# Patient Record
Sex: FEMALE | URBAN - METROPOLITAN AREA
[De-identification: names, ages, dates, MRNs, and addresses within clinical notes are randomized per-mention and may not be internally consistent; named-entity substitution may affect disease eponyms.]

---

## 2017-07-12 ENCOUNTER — OFFICE VISIT (OUTPATIENT)
Dept: INTERNAL MEDICINE CLINIC | Age: 42
End: 2017-07-12

## 2017-07-12 VITALS
OXYGEN SATURATION: 98 % | BODY MASS INDEX: 23.07 KG/M2 | TEMPERATURE: 97.5 F | RESPIRATION RATE: 20 BRPM | SYSTOLIC BLOOD PRESSURE: 108 MMHG | HEIGHT: 67 IN | DIASTOLIC BLOOD PRESSURE: 64 MMHG | HEART RATE: 65 BPM | WEIGHT: 147 LBS

## 2017-07-12 DIAGNOSIS — G35 MULTIPLE SCLEROSIS (HCC): ICD-10-CM

## 2017-07-12 DIAGNOSIS — M79.601 RIGHT ARM PAIN: Primary | ICD-10-CM

## 2017-07-12 DIAGNOSIS — M54.9 UPPER BACK PAIN ON RIGHT SIDE: ICD-10-CM

## 2017-07-12 DIAGNOSIS — M43.6 STIFFNESS OF NECK: ICD-10-CM

## 2017-07-12 RX ORDER — PREDNISONE 20 MG/1
TABLET ORAL
Qty: 13 TAB | Refills: 0 | Status: SHIPPED | OUTPATIENT
Start: 2017-07-12 | End: 2017-07-18 | Stop reason: ALTCHOICE

## 2017-07-12 NOTE — MR AVS SNAPSHOT
Visit Information Date & Time Provider Department Dept. Phone Encounter #  
 7/12/2017  1:00 PM Luis Carrion, [address] [facility],[address] Internal Medicine [phone] [id number] Upcoming Health Maintenance Date Due DTaP/Tdap/Td series (1 - Tdap) 6/18/1994 PAP AKA CERVICAL CYTOLOGY 6/18/1994 INFLUENZA AGE 9 TO ADULT 8/1/2017 Allergies as of 7/12/2017  Review Complete On: 7/12/2017 By: Luis Carrion MD  
 No Known Allergies Current Immunizations  Never Reviewed No immunizations on file. Not reviewed this visit You Were Diagnosed With   
  
 Codes Comments Right arm pain    -  Primary ICD-10-CM: W74.829 ICD-9-CM: 729.5 Upper back pain on right side     ICD-10-CM: M54.9 ICD-9-CM: 724.5 Stiffness of neck     ICD-10-CM: M43.6 ICD-9-CM: 723.5 [facility] sclerosis ([facility]     ICD-10-CM: G35 
ICD-9-CM: 060 Vitals BP Pulse Temp Resp Height(growth percentile) Weight(growth percentile) 108/64 65 97.5 °F (36.4 °C) (Oral) 20 5' 7\" (1.702 m) 147 lb (66.7 kg) LMP SpO2 BMI OB Status Smoking Status 07/04/2017 98% 23.02 kg/m2 Having regular periods Never Smoker BMI and BSA Data Body Mass Index Body Surface Area 23.02 kg/m 2 1.78 m 2 Preferred Pharmacy Pharmacy Name Phone [facility] [phone] [facility] [phone] Your Updated Medication List  
  
   
This list is accurate as of: 7/12/17  1:44 PM.  Always use your most recent med list.  
  
  
  
  
 baclofen 10 mg tablet Commonly known as:  LIORESAL Take 1 Tab by mouth two (2) times a day. multivitamin tablet Commonly known as:  ONE A DAY Take 1 Tab by mouth daily. predniSONE 20 mg tablet Commonly known as:  Sydnie Durán Prednisone 60 mg po x 2 days, 40 mg po x 2 days, 20 mg po x 2  days, 10 mg po x 1 day then stop. TECFIDERA 240 mg Cpdr  
Generic drug:  dimethyl fumarate Take  by mouth. Prescriptions Sent to Pharmacy Refills  
 predniSONE (DELTASONE) 20 mg tablet 0 Sig: Prednisone 60 mg po x 2 days, 40 mg po x 2 days, 20 mg po x 2  days, 10 mg po x 1 day then stop. Class: Normal  
 Pharmacy: [address] [facility] [address] [facility] #: [phone] [facility]! Dear Sissy Betts: Thank you for requesting a [employer] account. Our records indicate that you already have an active [employer] account. You can access your account anytime at [email or web address] Did you know that you can access your hospital and ER discharge instructions at any time in [employer]? You can also review all of your test results from your hospital stay or ER visit. Additional Information If you have questions, please visit the Frequently Asked Questions section of the [employer] website at [email or web address] Remember, [employer] is NOT to be used for urgent needs. For medical emergencies, dial 911. Now available from your iPhone and Android! Please provide this summary of care documentation to your next provider. Your primary care clinician is listed as Brian Muñoz. If you have any questions after today's visit, please call [phone].

## 2017-07-12 NOTE — PROGRESS NOTES
Written by Sivakumar Mojica, as dictated by Dr. Hazel Vance MD.    Miguel Bell is a 38 y.o. female. HPI  The patient comes in today c/o R arm pain and upper back pain for the past 3 days. She has been taking OTC ibuprofen which did not help. She has also tried Bengay The pain started as nighttime pain but is now during the day as well. She has hx of multiple sclerosis and [place] [facility] [place]. She has not had a flare-up since 11/2015. She is compliant on baclofen and vitamin D supplements. She denies trying to lift anything heavy but she has been doing some exercises. Patient Active Problem List   Diagnosis Code    Urinary incontinence R32    Fatigue R53.83    Constipation K59.00    Multiple sclerosis ([facility] G35        Current Outpatient Prescriptions on File Prior to Visit   Medication Sig Dispense Refill    dimethyl fumarate (TECFIDERA) 240 mg cpDR Take  by mouth.  multivitamin (ONE A DAY) tablet Take 1 Tab by mouth daily.  baclofen (LIORESAL) 10 mg tablet Take 1 Tab by mouth two (2) times a day. 90 Tab 3     No current facility-administered medications on file prior to visit. No Known Allergies    Past Medical History:   Diagnosis Date    Constipation 6/24/2009    Fatigue 6/24/2009    MS (multiple sclerosis) ([facility]     Urinary incontinence 6/24/2009       Past Surgical History:   Procedure Laterality Date    HX GYN      tubal ligation       Family History   Problem Relation Age of Onset    Liver Disease Mother        Social History     Social History    Marital status:      Spouse name: N/A    Number of children: N/A    Years of education: N/A     Occupational History    Not on file.      Social History Main Topics    Smoking status: Never Smoker    Smokeless tobacco: Never Used    Alcohol use No    Drug use: No    Sexual activity: Yes     Partners: Male      Comment:      Other Topics Concern    Not on file     Social History Narrative           Review of Systems   Constitutional: Negative for malaise/fatigue. HENT: Negative for congestion. Respiratory: Negative for cough and shortness of breath. Musculoskeletal: Positive for back pain and joint pain. Negative for myalgias. Neurological: Negative for weakness and headaches. Visit Vitals    /64    Pulse 65    Temp 97.5 °F (36.4 °C) (Oral)    Resp 20    Ht 5' 7\" (1.702 m)    Wt 147 lb (66.7 kg)    LMP 07/04/2017    SpO2 98%    BMI 23.02 kg/m2       Physical Exam   Constitutional: She is oriented to person, place, and time. She appears well-developed and well-nourished. No distress. HENT:   Right Ear: External ear normal.   Left Ear: External ear normal.   Eyes: Conjunctivae and EOM are normal. Right eye exhibits no discharge. Left eye exhibits no discharge. Neck: Normal range of motion. Neck supple. Cardiovascular: Normal rate and regular rhythm. Pulmonary/Chest: Effort normal and breath sounds normal. She has no wheezes. Abdominal: Soft. Bowel sounds are normal. There is no tenderness. Musculoskeletal: Normal range of motion. Normal ROM on R arm. Tenderness & upper back stiffness. Lymphadenopathy:     She has no cervical adenopathy. Neurological: She is alert and oriented to person, place, and time. Skin: She is not diaphoretic. Psychiatric: She has a normal mood and affect. Her behavior is normal.   Nursing note and vitals reviewed. ASSESSMENT and PLAN    ICD-10-CM ICD-9-CM    1. Right arm pain M79.601 729. 5 predniSONE (DELTASONE) 20 mg tablet sent to pharmacy   2. Upper back pain on right side M54.9 724.5 predniSONE (DELTASONE) 20 mg tablet sent to pharmacy   3. Stiffness of neck M43.6 723. 5 predniSONE (DELTASONE) 20 mg tablet sent to pharmacy    I want the patient to take the medication po as follows: 3 pills x 1 day, 2 pills x 2 days, 1 pill x 2 days and 1/2 pill x 1 day.  The patient was advised to take this medication with food. She should continue to take baclofen. 4. Multiple sclerosis ([facility] G35 340   [place] [facility] This plan was reviewed with the patient and patient agrees. All questions were answered. This scribe documentation was reviewed by me and accurately reflects the examination and decisions made by me. This note will not be viewable in [address]

## 2017-07-17 ENCOUNTER — DOCUMENTATION ONLY (OUTPATIENT)
Dept: INTERNAL MEDICINE CLINIC | Age: 42
End: 2017-07-17

## 2017-07-18 ENCOUNTER — DOCUMENTATION ONLY (OUTPATIENT)
Dept: INTERNAL MEDICINE CLINIC | Age: 42
End: 2017-07-18

## 2017-07-18 ENCOUNTER — OFFICE VISIT (OUTPATIENT)
Dept: INTERNAL MEDICINE CLINIC | Age: 42
End: 2017-07-18

## 2017-07-18 VITALS
RESPIRATION RATE: 18 BRPM | SYSTOLIC BLOOD PRESSURE: 112 MMHG | OXYGEN SATURATION: 98 % | TEMPERATURE: 98.1 F | DIASTOLIC BLOOD PRESSURE: 60 MMHG | HEART RATE: 105 BPM

## 2017-07-18 DIAGNOSIS — M79.601 RIGHT ARM PAIN: Primary | ICD-10-CM

## 2017-07-18 DIAGNOSIS — G35 MULTIPLE SCLEROSIS (HCC): ICD-10-CM

## 2017-07-18 RX ORDER — ACETAMINOPHEN AND CODEINE PHOSPHATE 300; 30 MG/1; MG/1
1 TABLET ORAL
Qty: 15 TAB | Refills: 0 | Status: SHIPPED | OUTPATIENT
Start: 2017-07-18 | End: 2018-02-21 | Stop reason: ALTCHOICE

## 2017-07-18 NOTE — PROGRESS NOTES
Rapid referral sheet for [facility] has been completed and attached notes from office visit. Faxed to number on form.

## 2017-07-18 NOTE — PROGRESS NOTES
Chief Complaint   Patient presents with    Shoulder Pain     right, pain goes and comes, meds aren't working    Back Pain     1. Have you been to the ER, urgent care clinic since your last visit? Hospitalized since your last visit? No    2. Have you seen or consulted any other health care providers outside of the [address] [place] [address] since your last visit? Include any pap smears or colon screening.  No

## 2017-07-18 NOTE — PROGRESS NOTES
Written by Suly aVrela, as dictated by Dr. Ariana Hadley MD.    Prisca Morocho is a 38 y.o. female. HPI  The patient comes in today c/o upper  back and shoulder pain. She finished a course of prednisone which she started on 07/12. The pain has lessened in intensity but has not gone away completely. She is not sleeping well at night. Pain starts  from the back of the neck radiating to the right arm. Patient Active Problem List   Diagnosis Code    Urinary incontinence R32    Fatigue R53.83    Constipation K59.00    Multiple sclerosis ([facility] G35        Current Outpatient Prescriptions on File Prior to Visit   Medication Sig Dispense Refill    dimethyl fumarate (TECFIDERA) 240 mg cpDR Take  by mouth.  multivitamin (ONE A DAY) tablet Take 1 Tab by mouth daily.  baclofen (LIORESAL) 10 mg tablet Take 1 Tab by mouth two (2) times a day. 90 Tab 3     No current facility-administered medications on file prior to visit. Allergies   Allergen Reactions    Penicillins Rash       Past Medical History:   Diagnosis Date    Constipation 6/24/2009    Fatigue 6/24/2009    MS (multiple sclerosis) ([facility]     Urinary incontinence 6/24/2009       Past Surgical History:   Procedure Laterality Date    HX GYN      tubal ligation       Family History   Problem Relation Age of Onset    Liver Disease Mother        Social History     Social History    Marital status:      Spouse name: N/A    Number of children: N/A    Years of education: N/A     Occupational History    Not on file. Social History Main Topics    Smoking status: Never Smoker    Smokeless tobacco: Never Used    Alcohol use No    Drug use: No    Sexual activity: Yes     Partners: Male      Comment:      Other Topics Concern    Not on file     Social History Narrative         Review of Systems   Constitutional: Negative for malaise/fatigue. HENT: Negative for congestion. Musculoskeletal: Positive for back pain and neck pain. Negative for joint pain and myalgias. Neurological: Positive for tingling and sensory change. Negative for weakness and headaches. Visit Vitals    /60 (BP 1 Location: Left arm, BP Patient Position: Sitting)    Pulse (!) 105    Temp 98.1 °F (36.7 °C) (Oral)    Resp 18    LMP 07/04/2017    SpO2 98%       Physical Exam   Constitutional: She is oriented to person, place, and time. She appears well-developed and well-nourished. No distress. Eyes: Conjunctivae and EOM are normal.   Cardiovascular: Normal rate and regular rhythm. Pulmonary/Chest: Effort normal and breath sounds normal. She has no wheezes. Abdominal: Soft. Bowel sounds are normal.   Musculoskeletal: She exhibits no edema. R upper back tenderness   ROM on cervical spine normal.   Neurological: She is alert and oriented to person, place, and time. Psychiatric: She has a normal mood and affect. Nursing note and vitals reviewed. ASSESSMENT and PLAN    ICD-10-CM ICD-9-CM    1. Right arm pain M79.601 729.5 REFERRAL TO HOME HEALTH      acetaminophen-codeine (TYLENOL #3) 300-30 mg per tablet script given to patient    I want her to start on home health PT. She can also take ibuprofen, which should be taken with food. If the pain persists, I also gave her Tylenol with codeine to take at night. 2. Multiple sclerosis ([facility] G35 [address] [facility]      acetaminophen-codeine (TYLENOL #3) 300-30 mg per tablet script given to patient    She needs to get a neck MRI when she goes in to get her next brain  MRI. This plan was reviewed with the patient and patient agrees. All questions were answered. This scribe documentation was reviewed by me and accurately reflects the examination and decisions made by me. This note will not be viewable in [address]

## 2017-10-17 ENCOUNTER — OFFICE VISIT (OUTPATIENT)
Dept: INTERNAL MEDICINE CLINIC | Age: 42
End: 2017-10-17

## 2017-10-17 VITALS
DIASTOLIC BLOOD PRESSURE: 62 MMHG | HEART RATE: 80 BPM | RESPIRATION RATE: 16 BRPM | HEIGHT: 67 IN | TEMPERATURE: 98.1 F | OXYGEN SATURATION: 98 % | SYSTOLIC BLOOD PRESSURE: 94 MMHG

## 2017-10-17 DIAGNOSIS — G35 MULTIPLE SCLEROSIS (HCC): ICD-10-CM

## 2017-10-17 DIAGNOSIS — N39.490 OVERFLOW INCONTINENCE OF URINE: ICD-10-CM

## 2017-10-17 DIAGNOSIS — M50.30 DEGENERATIVE DISC DISEASE, CERVICAL: ICD-10-CM

## 2017-10-17 DIAGNOSIS — Z23 ENCOUNTER FOR IMMUNIZATION: ICD-10-CM

## 2017-10-17 DIAGNOSIS — E55.9 VITAMIN D DEFICIENCY: ICD-10-CM

## 2017-10-17 DIAGNOSIS — M79.631 RIGHT FOREARM PAIN: Primary | ICD-10-CM

## 2017-10-17 DIAGNOSIS — Z79.52 LONG TERM CURRENT USE OF SYSTEMIC STEROIDS: ICD-10-CM

## 2017-10-17 RX ORDER — ERGOCALCIFEROL 1.25 MG/1
50000 CAPSULE ORAL
Qty: 30 CAP | Refills: 0 | Status: SHIPPED | OUTPATIENT
Start: 2017-10-17 | End: 2018-05-09

## 2017-10-17 RX ORDER — ERGOCALCIFEROL 1.25 MG/1
50000 CAPSULE ORAL
Qty: 30 CAP | Refills: 0 | Status: SHIPPED | OUTPATIENT
Start: 2017-10-17 | End: 2017-10-17 | Stop reason: SDUPTHER

## 2017-10-17 RX ORDER — OXYBUTYNIN CHLORIDE 5 MG/1
5 TABLET ORAL 3 TIMES DAILY
Qty: 90 TAB | Refills: 0 | Status: SHIPPED | OUTPATIENT
Start: 2017-10-17 | End: 2017-10-17 | Stop reason: SDUPTHER

## 2017-10-17 RX ORDER — TRAMADOL HYDROCHLORIDE 50 MG/1
TABLET ORAL
COMMUNITY
Start: 2017-10-15 | End: 2018-02-21 | Stop reason: ALTCHOICE

## 2017-10-17 RX ORDER — OXYBUTYNIN CHLORIDE 5 MG/1
5 TABLET ORAL 3 TIMES DAILY
Qty: 90 TAB | Refills: 0 | Status: SHIPPED | OUTPATIENT
Start: 2017-10-17 | End: 2017-11-16

## 2017-10-17 RX ORDER — PREDNISONE 20 MG/1
TABLET ORAL
COMMUNITY
Start: 2017-10-15 | End: 2018-02-21 | Stop reason: ALTCHOICE

## 2017-10-17 RX ORDER — DICLOFENAC SODIUM 10 MG/G
4 GEL TOPICAL 4 TIMES DAILY
Qty: 4 EACH | Refills: 0 | Status: SHIPPED | OUTPATIENT
Start: 2017-10-17 | End: 2017-11-06

## 2017-10-17 NOTE — PROGRESS NOTES
Written by Christine Gutierres, as dictated by Dr. Sampson Garcia MD.    Janell Hernandez is a 38 y.o. female. HPI  The patient comes in today c/o R forearm pain, which she has experienced before. The pain did improve after home PT, but the pain started again 3-4 days ago. She went to [facility] ED where she was given prednisone and got XRs and a CT, which showed mild cervical degenerative disc disease with no acute c-spine fracture. She has not lifted anything heavy lately, but she does readjust herself on the floor with her arms. The prednisone has not been helping with her pain. She has never had a DEXA scan. She has been experiencing urinary incontinence and would like to try medication for this. She received a flu shot today. Patient Active Problem List   Diagnosis Code    Urinary incontinence R32    Fatigue R53.83    Constipation K59.00    Multiple sclerosis ([facility] G35        Current Outpatient Prescriptions on File Prior to Visit   Medication Sig Dispense Refill    dimethyl fumarate (TECFIDERA) 240 mg cpDR Take  by mouth.  multivitamin (ONE A DAY) tablet Take 1 Tab by mouth daily.  baclofen (LIORESAL) 10 mg tablet Take 1 Tab by mouth two (2) times a day. 90 Tab 3    acetaminophen-codeine (TYLENOL #3) 300-30 mg per tablet Take 1 Tab by mouth every six (6) hours as needed for Pain for up to 15 doses. Max Daily Amount: 4 Tabs. 15 Tab 0     No current facility-administered medications on file prior to visit.         Allergies   Allergen Reactions    Penicillins Rash       Past Medical History:   Diagnosis Date    Constipation 6/24/2009    Fatigue 6/24/2009    MS (multiple sclerosis) ([facility]     Urinary incontinence 6/24/2009       Past Surgical History:   Procedure Laterality Date    HX GYN      tubal ligation       Family History   Problem Relation Age of Onset    Liver Disease Mother        Social History     Social History    Marital status:      Spouse name: N/A    Number of children: N/A    Years of education: N/A     Occupational History    Not on file. Social History Main Topics    Smoking status: Never Smoker    Smokeless tobacco: Never Used    Alcohol use No    Drug use: No    Sexual activity: Yes     Partners: Male      Comment:      Other Topics Concern    Not on file     Social History Narrative           Review of Systems   Constitutional: Negative for malaise/fatigue. HENT: Negative for congestion. Respiratory: Negative for cough and shortness of breath. Cardiovascular: Negative for chest pain and palpitations. Gastrointestinal: Negative for abdominal pain and nausea. Genitourinary: Positive for frequency. Negative for dysuria and flank pain. Musculoskeletal: Positive for joint pain. Negative for myalgias. Neurological: Positive for sensory change. Negative for dizziness, weakness and headaches. Visit Vitals    BP 94/62 (BP 1 Location: Left arm, BP Patient Position: Sitting)    Pulse 80    Temp 98.1 °F (36.7 °C) (Oral)    Resp 16    Ht 5' 7\" (1.702 m)    LMP 10/03/2017    SpO2 98%       Physical Exam   Constitutional: She is oriented to person, place, and time. She appears well-developed and well-nourished. No distress. HENT:   Right Ear: External ear normal.   Left Ear: External ear normal.   Eyes: Conjunctivae and EOM are normal.   Neck: Normal range of motion. Neck supple. Cardiovascular: Normal rate and regular rhythm. Pulmonary/Chest: Effort normal and breath sounds normal.   Abdominal: Soft. Bowel sounds are normal. There is no tenderness. Musculoskeletal:   Right forearm no swelling. ROM nl on wrist & elbow joint. Neurological: She is alert and oriented to person, place, and time. Skin: She is not diaphoretic. Psychiatric: She has a normal mood and affect. Her behavior is normal.   Nursing note and vitals reviewed.       ASSESSMENT and PLAN    ICD-10-CM ICD-9-CM    1. Right forearm pain M79.631 729.5 REFERRAL TO HOME HEALTH      diclofenac (VOLTAREN) 1 % gel sent to pharmacy   2. Encounter for immunization Z23 V03.89 INFLUENZA VIRUS VAC QUAD,SPLIT,PRESV FREE SYRINGE IM   3. Multiple sclerosis ([facility] G35 [address]    Referred to home health PT. Diclofenac gel  prescribed. 4. Long term current use of systemic steroids Z79.52 V58.65 DEXA BONE DENSITY STUDY AXIAL    I want her to get a DEXA scan since she has been on prednisone for a long time. 5. Vitamin D deficiency E55.9 268.9 ergocalciferol (ERGOCALCIFEROL) 50,000 unit capsule sent to pharmacy          Vitamin D supplements prescribed. 6. Overflow incontinence of urine N39.490 788.38 oxybutynin (DITROPAN) 5 mg tablet sent to pharmacy        Ditropan prescribed. She can take 1/2 pill at night and see if it works. I explained this can be taken up to TID. This plan was reviewed with the patient and patient agrees. All questions were answered. This scribe documentation was reviewed by me and accurately reflects the examination and decisions made by me. This note will not be viewable in [address]

## 2017-10-17 NOTE — PROGRESS NOTES
Chief Complaint   Patient presents with    Shoulder Pain     right shoulder pain and neck pain went to [facility] ER on Sunday.

## 2017-10-21 ENCOUNTER — HOME HEALTH ADMISSION (OUTPATIENT)
Dept: HOME HEALTH SERVICES | Facility: HOME HEALTH | Age: 42
End: 2017-10-21
Payer: COMMERCIAL

## 2017-10-28 ENCOUNTER — HOME CARE VISIT (OUTPATIENT)
Dept: SCHEDULING | Facility: HOME HEALTH | Age: 42
End: 2017-10-28

## 2017-10-29 ENCOUNTER — HOME CARE VISIT (OUTPATIENT)
Dept: SCHEDULING | Facility: HOME HEALTH | Age: 42
End: 2017-10-29
Payer: COMMERCIAL

## 2017-10-29 VITALS
SYSTOLIC BLOOD PRESSURE: 96 MMHG | RESPIRATION RATE: 16 BRPM | OXYGEN SATURATION: 97 % | DIASTOLIC BLOOD PRESSURE: 54 MMHG | TEMPERATURE: 98.1 F | HEART RATE: 62 BPM

## 2017-10-29 PROCEDURE — 400013 HH SOC

## 2017-10-29 PROCEDURE — G0151 HHCP-SERV OF PT,EA 15 MIN: HCPCS

## 2017-10-31 ENCOUNTER — HOME CARE VISIT (OUTPATIENT)
Dept: SCHEDULING | Facility: HOME HEALTH | Age: 42
End: 2017-10-31
Payer: COMMERCIAL

## 2017-10-31 VITALS — DIASTOLIC BLOOD PRESSURE: 59 MMHG | OXYGEN SATURATION: 98 % | TEMPERATURE: 96.7 F | SYSTOLIC BLOOD PRESSURE: 100 MMHG

## 2017-10-31 VITALS
RESPIRATION RATE: 16 BRPM | SYSTOLIC BLOOD PRESSURE: 122 MMHG | DIASTOLIC BLOOD PRESSURE: 80 MMHG | OXYGEN SATURATION: 97 % | HEART RATE: 80 BPM

## 2017-10-31 PROCEDURE — G0152 HHCP-SERV OF OT,EA 15 MIN: HCPCS

## 2017-10-31 PROCEDURE — G0151 HHCP-SERV OF PT,EA 15 MIN: HCPCS

## 2017-11-02 ENCOUNTER — HOME CARE VISIT (OUTPATIENT)
Dept: HOME HEALTH SERVICES | Facility: HOME HEALTH | Age: 42
End: 2017-11-02
Payer: COMMERCIAL

## 2017-11-03 ENCOUNTER — HOME CARE VISIT (OUTPATIENT)
Dept: HOME HEALTH SERVICES | Facility: HOME HEALTH | Age: 42
End: 2017-11-03
Payer: COMMERCIAL

## 2017-11-03 ENCOUNTER — HOME CARE VISIT (OUTPATIENT)
Dept: SCHEDULING | Facility: HOME HEALTH | Age: 42
End: 2017-11-03
Payer: COMMERCIAL

## 2017-11-06 ENCOUNTER — HOME CARE VISIT (OUTPATIENT)
Dept: SCHEDULING | Facility: HOME HEALTH | Age: 42
End: 2017-11-06
Payer: COMMERCIAL

## 2017-11-06 VITALS
DIASTOLIC BLOOD PRESSURE: 60 MMHG | TEMPERATURE: 98.2 F | HEART RATE: 67 BPM | SYSTOLIC BLOOD PRESSURE: 102 MMHG | OXYGEN SATURATION: 96 % | RESPIRATION RATE: 18 BRPM

## 2017-11-06 PROCEDURE — G0151 HHCP-SERV OF PT,EA 15 MIN: HCPCS

## 2017-11-08 ENCOUNTER — HOME CARE VISIT (OUTPATIENT)
Dept: SCHEDULING | Facility: HOME HEALTH | Age: 42
End: 2017-11-08
Payer: COMMERCIAL

## 2017-11-08 VITALS
SYSTOLIC BLOOD PRESSURE: 115 MMHG | TEMPERATURE: 96.5 F | OXYGEN SATURATION: 98 % | HEART RATE: 125 BPM | DIASTOLIC BLOOD PRESSURE: 60 MMHG

## 2017-11-08 VITALS
DIASTOLIC BLOOD PRESSURE: 60 MMHG | OXYGEN SATURATION: 98 % | SYSTOLIC BLOOD PRESSURE: 116 MMHG | RESPIRATION RATE: 16 BRPM | HEART RATE: 124 BPM | TEMPERATURE: 98 F

## 2017-11-08 PROCEDURE — G0152 HHCP-SERV OF OT,EA 15 MIN: HCPCS

## 2017-11-08 PROCEDURE — G0151 HHCP-SERV OF PT,EA 15 MIN: HCPCS

## 2017-11-13 ENCOUNTER — HOME CARE VISIT (OUTPATIENT)
Dept: SCHEDULING | Facility: HOME HEALTH | Age: 42
End: 2017-11-13
Payer: COMMERCIAL

## 2017-11-16 ENCOUNTER — HOME CARE VISIT (OUTPATIENT)
Dept: SCHEDULING | Facility: HOME HEALTH | Age: 42
End: 2017-11-16
Payer: COMMERCIAL

## 2017-11-16 VITALS
OXYGEN SATURATION: 97 % | HEART RATE: 106 BPM | TEMPERATURE: 98.8 F | DIASTOLIC BLOOD PRESSURE: 68 MMHG | RESPIRATION RATE: 16 BRPM | SYSTOLIC BLOOD PRESSURE: 110 MMHG

## 2017-11-16 PROCEDURE — G0299 HHS/HOSPICE OF RN EA 15 MIN: HCPCS

## 2017-11-17 ENCOUNTER — HOME CARE VISIT (OUTPATIENT)
Dept: SCHEDULING | Facility: HOME HEALTH | Age: 42
End: 2017-11-17
Payer: COMMERCIAL

## 2017-11-17 ENCOUNTER — HOME CARE VISIT (OUTPATIENT)
Dept: HOME HEALTH SERVICES | Facility: HOME HEALTH | Age: 42
End: 2017-11-17
Payer: COMMERCIAL

## 2017-11-17 VITALS
SYSTOLIC BLOOD PRESSURE: 112 MMHG | TEMPERATURE: 98.2 F | DIASTOLIC BLOOD PRESSURE: 64 MMHG | OXYGEN SATURATION: 96 % | HEART RATE: 97 BPM | RESPIRATION RATE: 16 BRPM

## 2017-11-17 PROCEDURE — G0300 HHS/HOSPICE OF LPN EA 15 MIN: HCPCS

## 2017-11-17 PROCEDURE — G0151 HHCP-SERV OF PT,EA 15 MIN: HCPCS

## 2017-11-18 ENCOUNTER — HOME CARE VISIT (OUTPATIENT)
Dept: HOME HEALTH SERVICES | Facility: HOME HEALTH | Age: 42
End: 2017-11-18
Payer: COMMERCIAL

## 2017-11-20 ENCOUNTER — HOME CARE VISIT (OUTPATIENT)
Dept: SCHEDULING | Facility: HOME HEALTH | Age: 42
End: 2017-11-20
Payer: COMMERCIAL

## 2017-11-20 VITALS
OXYGEN SATURATION: 98 % | HEART RATE: 102 BPM | DIASTOLIC BLOOD PRESSURE: 66 MMHG | RESPIRATION RATE: 16 BRPM | TEMPERATURE: 97.4 F | SYSTOLIC BLOOD PRESSURE: 110 MMHG

## 2017-11-20 VITALS
OXYGEN SATURATION: 99 % | DIASTOLIC BLOOD PRESSURE: 60 MMHG | SYSTOLIC BLOOD PRESSURE: 100 MMHG | TEMPERATURE: 98.7 F | HEART RATE: 99 BPM

## 2017-11-20 PROCEDURE — G0300 HHS/HOSPICE OF LPN EA 15 MIN: HCPCS

## 2017-11-20 PROCEDURE — G0152 HHCP-SERV OF OT,EA 15 MIN: HCPCS

## 2017-11-21 ENCOUNTER — HOME CARE VISIT (OUTPATIENT)
Dept: SCHEDULING | Facility: HOME HEALTH | Age: 42
End: 2017-11-21
Payer: COMMERCIAL

## 2017-11-21 VITALS
SYSTOLIC BLOOD PRESSURE: 122 MMHG | OXYGEN SATURATION: 99 % | RESPIRATION RATE: 16 BRPM | DIASTOLIC BLOOD PRESSURE: 70 MMHG | HEART RATE: 95 BPM

## 2017-11-21 PROCEDURE — G0151 HHCP-SERV OF PT,EA 15 MIN: HCPCS

## 2017-11-22 ENCOUNTER — HOME CARE VISIT (OUTPATIENT)
Dept: SCHEDULING | Facility: HOME HEALTH | Age: 42
End: 2017-11-22
Payer: COMMERCIAL

## 2017-11-22 PROCEDURE — G0151 HHCP-SERV OF PT,EA 15 MIN: HCPCS

## 2017-11-23 ENCOUNTER — HOME CARE VISIT (OUTPATIENT)
Dept: SCHEDULING | Facility: HOME HEALTH | Age: 42
End: 2017-11-23
Payer: COMMERCIAL

## 2017-11-23 PROCEDURE — G0300 HHS/HOSPICE OF LPN EA 15 MIN: HCPCS

## 2017-11-24 DIAGNOSIS — B37.31 VAGINAL CANDIDIASIS: Primary | ICD-10-CM

## 2017-11-24 RX ORDER — FLUCONAZOLE 150 MG/1
150 TABLET ORAL DAILY
Qty: 1 TAB | Refills: 0 | Status: SHIPPED | OUTPATIENT
Start: 2017-11-24 | End: 2017-11-25

## 2017-11-27 ENCOUNTER — HOME CARE VISIT (OUTPATIENT)
Dept: SCHEDULING | Facility: HOME HEALTH | Age: 42
End: 2017-11-27
Payer: COMMERCIAL

## 2017-11-27 VITALS
OXYGEN SATURATION: 98 % | SYSTOLIC BLOOD PRESSURE: 114 MMHG | HEART RATE: 72 BPM | TEMPERATURE: 97.5 F | DIASTOLIC BLOOD PRESSURE: 74 MMHG | RESPIRATION RATE: 16 BRPM

## 2017-11-27 VITALS
HEART RATE: 82 BPM | RESPIRATION RATE: 17 BRPM | SYSTOLIC BLOOD PRESSURE: 118 MMHG | TEMPERATURE: 98 F | DIASTOLIC BLOOD PRESSURE: 70 MMHG | OXYGEN SATURATION: 97 %

## 2017-11-27 PROCEDURE — G0152 HHCP-SERV OF OT,EA 15 MIN: HCPCS

## 2017-11-27 PROCEDURE — G0299 HHS/HOSPICE OF RN EA 15 MIN: HCPCS

## 2017-11-28 ENCOUNTER — HOME CARE VISIT (OUTPATIENT)
Dept: SCHEDULING | Facility: HOME HEALTH | Age: 42
End: 2017-11-28
Payer: COMMERCIAL

## 2017-11-28 VITALS
OXYGEN SATURATION: 99 % | HEART RATE: 90 BPM | TEMPERATURE: 98.4 F | SYSTOLIC BLOOD PRESSURE: 122 MMHG | DIASTOLIC BLOOD PRESSURE: 70 MMHG | RESPIRATION RATE: 16 BRPM

## 2017-11-28 VITALS — OXYGEN SATURATION: 95 % | HEART RATE: 82 BPM | DIASTOLIC BLOOD PRESSURE: 60 MMHG | SYSTOLIC BLOOD PRESSURE: 110 MMHG

## 2017-11-28 PROCEDURE — G0151 HHCP-SERV OF PT,EA 15 MIN: HCPCS

## 2017-11-29 ENCOUNTER — HOME CARE VISIT (OUTPATIENT)
Dept: SCHEDULING | Facility: HOME HEALTH | Age: 42
End: 2017-11-29
Payer: COMMERCIAL

## 2017-11-29 PROCEDURE — G0152 HHCP-SERV OF OT,EA 15 MIN: HCPCS

## 2017-11-30 ENCOUNTER — HOME CARE VISIT (OUTPATIENT)
Dept: SCHEDULING | Facility: HOME HEALTH | Age: 42
End: 2017-11-30
Payer: COMMERCIAL

## 2017-11-30 VITALS
OXYGEN SATURATION: 96 % | SYSTOLIC BLOOD PRESSURE: 120 MMHG | DIASTOLIC BLOOD PRESSURE: 70 MMHG | TEMPERATURE: 98.1 F | HEART RATE: 86 BPM

## 2017-11-30 PROCEDURE — G0151 HHCP-SERV OF PT,EA 15 MIN: HCPCS

## 2017-12-01 VITALS
TEMPERATURE: 97.8 F | DIASTOLIC BLOOD PRESSURE: 70 MMHG | HEART RATE: 60 BPM | OXYGEN SATURATION: 99 % | RESPIRATION RATE: 16 BRPM | SYSTOLIC BLOOD PRESSURE: 118 MMHG

## 2017-12-04 ENCOUNTER — HOME CARE VISIT (OUTPATIENT)
Dept: SCHEDULING | Facility: HOME HEALTH | Age: 42
End: 2017-12-04
Payer: COMMERCIAL

## 2017-12-04 PROCEDURE — G0152 HHCP-SERV OF OT,EA 15 MIN: HCPCS

## 2017-12-05 ENCOUNTER — HOME CARE VISIT (OUTPATIENT)
Dept: SCHEDULING | Facility: HOME HEALTH | Age: 42
End: 2017-12-05
Payer: COMMERCIAL

## 2017-12-05 ENCOUNTER — DOCUMENTATION ONLY (OUTPATIENT)
Dept: INTERNAL MEDICINE CLINIC | Age: 42
End: 2017-12-05

## 2017-12-05 VITALS
DIASTOLIC BLOOD PRESSURE: 70 MMHG | TEMPERATURE: 98 F | SYSTOLIC BLOOD PRESSURE: 122 MMHG | OXYGEN SATURATION: 97 % | RESPIRATION RATE: 16 BRPM | HEART RATE: 80 BPM

## 2017-12-05 PROCEDURE — G0151 HHCP-SERV OF PT,EA 15 MIN: HCPCS

## 2017-12-07 ENCOUNTER — HOME CARE VISIT (OUTPATIENT)
Dept: SCHEDULING | Facility: HOME HEALTH | Age: 42
End: 2017-12-07
Payer: COMMERCIAL

## 2017-12-07 ENCOUNTER — HOME CARE VISIT (OUTPATIENT)
Dept: HOME HEALTH SERVICES | Facility: HOME HEALTH | Age: 42
End: 2017-12-07
Payer: COMMERCIAL

## 2017-12-07 VITALS
TEMPERATURE: 98.2 F | OXYGEN SATURATION: 97 % | DIASTOLIC BLOOD PRESSURE: 80 MMHG | HEART RATE: 84 BPM | SYSTOLIC BLOOD PRESSURE: 106 MMHG

## 2017-12-07 PROCEDURE — G0152 HHCP-SERV OF OT,EA 15 MIN: HCPCS

## 2017-12-07 PROCEDURE — G0151 HHCP-SERV OF PT,EA 15 MIN: HCPCS

## 2017-12-08 VITALS
TEMPERATURE: 97.9 F | SYSTOLIC BLOOD PRESSURE: 118 MMHG | OXYGEN SATURATION: 97 % | RESPIRATION RATE: 16 BRPM | HEART RATE: 90 BPM | DIASTOLIC BLOOD PRESSURE: 70 MMHG

## 2018-02-05 ENCOUNTER — DOCUMENTATION ONLY (OUTPATIENT)
Dept: INTERNAL MEDICINE CLINIC | Age: 43
End: 2018-02-05

## 2018-02-05 NOTE — PROGRESS NOTES
Form for supplie of  Joystick, vertical adjustment and labor to attach to chair,  Completed and sent to Dr Ben Briggs for signature.

## 2018-02-21 ENCOUNTER — OFFICE VISIT (OUTPATIENT)
Dept: INTERNAL MEDICINE CLINIC | Age: 43
End: 2018-02-21

## 2018-02-21 VITALS
TEMPERATURE: 97.7 F | SYSTOLIC BLOOD PRESSURE: 112 MMHG | DIASTOLIC BLOOD PRESSURE: 70 MMHG | RESPIRATION RATE: 16 BRPM | OXYGEN SATURATION: 99 % | HEIGHT: 67 IN | HEART RATE: 89 BPM

## 2018-02-21 DIAGNOSIS — M62.838 MUSCLE SPASM: ICD-10-CM

## 2018-02-21 DIAGNOSIS — N39.490 OVERFLOW INCONTINENCE OF URINE: ICD-10-CM

## 2018-02-21 DIAGNOSIS — G35 MULTIPLE SCLEROSIS (HCC): Primary | ICD-10-CM

## 2018-02-21 DIAGNOSIS — R53.82 CHRONIC FATIGUE: ICD-10-CM

## 2018-02-21 NOTE — MR AVS SNAPSHOT
[address] [facility] [address] [place] [address] [address] 
[phone] Patient: Nori Quiros MRN: [id number] LEC:[birth year] Visit Information Date & Time Provider Department Dept. Phone Encounter #  
 2/21/2018 12:15 PM Sirena Chiang MD [facility] Internal Medicine [phone] [id number] Upcoming Health Maintenance Date Due DTaP/Tdap/Td series (1 - Tdap) 6/18/1994 PAP AKA CERVICAL CYTOLOGY 6/18/1994 Allergies as of 2/21/2018  Review Complete On: 2/21/2018 By: Che Gallegos LPN Severity Noted Reaction Type Reactions Penicillins  07/12/2017    Rash Current Immunizations  Reviewed on 10/17/2017 Name Date Influenza Vaccine (Quad) PF 10/17/2017 Not reviewed this visit You Were Diagnosed With   
  
 Codes Comments Multiple sclerosis ([facility]    -  Primary ICD-10-CM: G35 
ICD-9-CM: 873 Chronic fatigue     ICD-10-CM: R53.82 
ICD-9-CM: 780.79 Overflow incontinence of urine     ICD-10-CM: N39.490 ICD-9-CM: 788.38 Muscle spasm     ICD-10-CM: G63.190 ICD-9-CM: 728.85 Vitals BP Pulse Temp Resp Height(growth percentile) LMP  
 112/70 (BP 1 Location: Right arm, BP Patient Position: Sitting) 89 97.7 °F (36.5 °C) (Oral) 16 5' 7\" (1.702 m) 02/13/2018 SpO2 OB Status Smoking Status 99% Having regular periods Never Smoker Preferred Pharmacy Pharmacy Name Phone [address] [phone] Your Updated Medication List  
  
   
This list is accurate as of 2/21/18  4:50 PM.  Always use your most recent med list.  
  
  
  
  
 baclofen 10 mg tablet Commonly known as:  LIORESAL Take 1 Tab by mouth two (2) times a day. ergocalciferol 50,000 unit capsule Commonly known as:  ERGOCALCIFEROL Take 1 Cap by mouth every seven (7) days for 30 doses. multivitamin tablet Commonly known as:  ONE A DAY  
 Take 1 Tab by mouth daily. TECFIDERA 240 mg Cpdr  
Generic drug:  dimethyl fumarate Take  by mouth. We Performed the Following CBC W/O DIFF [60815 CPT(R)] METABOLIC PANEL, COMPREHENSIVE [72935 CPT(R)] TSH 3RD GENERATION [47376 CPT(R)] VITAMIN D, 25 HYDROXY [id number] CPT(R)] Introducing [facility]! Dear Kirit Coleman: Thank you for requesting a [employer] account. Our records indicate that you already have an active [employer] account. You can access your account anytime at [email or web address] Did you know that you can access your hospital and ER discharge instructions at any time in [employer]? You can also review all of your test results from your hospital stay or ER visit. Additional Information If you have questions, please visit the Frequently Asked Questions section of the [employer] website at [email or web address] Remember, [employer] is NOT to be used for urgent needs. For medical emergencies, dial 911. Now available from your iPhone and Android! Please provide this summary of care documentation to your next provider. Your primary care clinician is listed as Alex Underwood. If you have any questions after today's visit, please call [phone].

## 2018-02-21 NOTE — PROGRESS NOTES
Chief Complaint   Patient presents with    Other     needs labs and discuss matter with doctor. Patient is not fasting.

## 2018-02-21 NOTE — PROGRESS NOTES
Written by Geoff Soto, as dictated by Dr. Rosaura Jorge MD.    Jorge Iverson is a 38 y.o. female. HPI  The patient comes in today for a hospital follow-up. She had her labs drawn at the hospital and her K was low in the hospital. She is not having any urinary incontinence or burning with urination. She finished her abx which was given at the hospital.    Her last [place] was [place] [facility] in [place] and did not show any new lesions. Her daughter is concerned about her leg swelling. She does not walk much and most of the time she is sitting at home on the floor. She has been sleeping fine. Home health has finished PT/OT and her arm pain has improved significantly. Patient Active Problem List   Diagnosis Code    Urinary incontinence R32    Fatigue R53.83    Constipation K59.00    Multiple sclerosis ([facility] G35        Current Outpatient Prescriptions on File Prior to Visit   Medication Sig Dispense Refill    ergocalciferol (ERGOCALCIFEROL) 50,000 unit capsule Take 1 Cap by mouth every seven (7) days for 30 doses. 30 Cap 0    dimethyl fumarate (TECFIDERA) 240 mg cpDR Take  by mouth.  multivitamin (ONE A DAY) tablet Take 1 Tab by mouth daily.  baclofen (LIORESAL) 10 mg tablet Take 1 Tab by mouth two (2) times a day. 90 Tab 3     No current facility-administered medications on file prior to visit. Allergies   Allergen Reactions    Penicillins Rash       Past Medical History:   Diagnosis Date    Constipation 6/24/2009    Fatigue 6/24/2009    MS (multiple sclerosis) ([facility]     Urinary incontinence 6/24/2009       Past Surgical History:   Procedure Laterality Date    HX GYN      tubal ligation       Family History   Problem Relation Age of Onset    Liver Disease Mother        Social History     Social History    Marital status:      Spouse name: N/A    Number of children: N/A    Years of education: N/A     Occupational History    Not on file. Social History Main Topics    Smoking status: Never Smoker    Smokeless tobacco: Never Used    Alcohol use No    Drug use: No    Sexual activity: Yes     Partners: Male      Comment:      Other Topics Concern    Not on file     Social History Narrative       Review of Systems   Constitutional: Negative for malaise/fatigue. HENT: Negative for congestion. Respiratory: Negative for cough and shortness of breath. Cardiovascular: Positive for leg swelling. Negative for chest pain and palpitations. Genitourinary: Negative for dysuria, frequency and urgency. Musculoskeletal: Negative for joint pain and myalgias. Neurological: Negative for weakness. Visit Vitals    /70 (BP 1 Location: Right arm, BP Patient Position: Sitting)    Pulse 89    Temp 97.7 °F (36.5 °C) (Oral)    Resp 16    Ht 5' 7\" (1.702 m)    LMP 02/13/2018    SpO2 99%       Physical Exam   Constitutional: She is oriented to person, place, and time. She appears well-developed and well-nourished. No distress. HENT:   Right Ear: External ear normal.   Left Ear: External ear normal.   Eyes: Conjunctivae and EOM are normal. Right eye exhibits no discharge. Left eye exhibits no discharge. Neck: Normal range of motion. Neck supple. Cardiovascular: Normal rate and regular rhythm. Pulmonary/Chest: Effort normal and breath sounds normal. She has no wheezes. Abdominal: Soft. Bowel sounds are normal. There is no tenderness. Musculoskeletal: She exhibits edema. BL 1+ non-pitting edema   Lymphadenopathy:     She has no cervical adenopathy. Neurological: She is alert and oriented to person, place, and time. Skin: She is not diaphoretic. Psychiatric: She has a normal mood and affect. Her behavior is normal.   Nursing note and vitals reviewed.       ASSESSMENT and PLAN    ICD-10-CM ICD-9-CM    1. Multiple sclerosis (HCC) G35 340 TSH 3RD GENERATION      METABOLIC PANEL, COMPREHENSIVE      CBC W/O DIFF VITAMIN D, 25 HYDROXY   2. Chronic fatigue R53.82 780.79 [place] [facility] No new sxs lately. Will repeat labs today. 3. Overflow incontinence of urine N39.490 788.38 Gave her urine cups, and if she experiences any burning or abdominal cramps/other signs of infection she should bring a urine sample to the office. No need for further abx or intervention at this time. 4. Muscle spasm M62.838 728.85 She is taking baclofen prn. Discussed if she feels stiffness she should start taking it daily at night. She should keep her feet elevated during the day. This plan was reviewed with the patient and patient agrees. All questions were answered. This scribe documentation was reviewed by me and accurately reflects the examination and decisions made by me. This note will not be viewable in [address]

## 2018-02-22 LAB
25(OH)D3+25(OH)D2 SERPL-MCNC: 17.1 NG/ML (ref 30–100)
ALBUMIN SERPL-MCNC: 4 G/DL (ref 3.5–5.5)
ALBUMIN/GLOB SERPL: 1.4 {RATIO} (ref 1.2–2.2)
ALP SERPL-CCNC: 76 IU/L (ref 39–117)
ALT SERPL-CCNC: 12 IU/L (ref 0–32)
AST SERPL-CCNC: 11 IU/L (ref 0–40)
BILIRUB SERPL-MCNC: <0.2 MG/DL (ref 0–1.2)
BUN SERPL-MCNC: 13 MG/DL (ref 6–24)
BUN/CREAT SERPL: 31 (ref 9–23)
CALCIUM SERPL-MCNC: 9 MG/DL (ref 8.7–10.2)
CHLORIDE SERPL-SCNC: 102 MMOL/L (ref 96–106)
CO2 SERPL-SCNC: 24 MMOL/L (ref 18–29)
CREAT SERPL-MCNC: 0.42 MG/DL (ref 0.57–1)
ERYTHROCYTE [DISTWIDTH] IN BLOOD BY AUTOMATED COUNT: 14.9 % (ref 12.3–15.4)
GFR SERPLBLD CREATININE-BSD FMLA CKD-EPI: 125 ML/MIN/{1.73_M2}
GFR SERPLBLD CREATININE-BSD FMLA CKD-EPI: 144 ML/MIN/{1.73_M2}
GLOBULIN SER CALC-MCNC: 2.8 G/L (ref 1.5–4.5)
GLUCOSE SERPL-MCNC: 76 MG/DL (ref 65–99)
HCT VFR BLD AUTO: 35.7 % (ref 34–46.6)
HGB BLD-MCNC: 11.4 G/DL (ref 11.1–15.9)
MCH RBC QN AUTO: 26.1 PG (ref 26.6–33)
MCHC RBC AUTO-ENTMCNC: 31.9 G/DL (ref 31.5–35.7)
MCV RBC AUTO: 82 FL (ref 79–97)
PLATELET # BLD AUTO: 347 X10E3/UL (ref 150–379)
POTASSIUM SERPL-SCNC: 4 MMOL/L (ref 3.5–5.2)
PROT SERPL-MCNC: 6.8 G/DL (ref 6–8.5)
RBC # BLD AUTO: 4.36 X10E6/UL (ref 3.77–5.28)
SODIUM SERPL-SCNC: 143 MMOL/L (ref 134–144)
TSH SERPL DL<=0.005 MIU/L-ACNC: 2.91 UIU/ML (ref 0.45–4.5)
WBC # BLD AUTO: 5.3 X10E3/UL (ref 3.4–10.8)

## 2018-02-22 NOTE — PROGRESS NOTES
Please ask her  if she is taking taking vitamin D 50.000 once a week dose? Her levels are still low. Rest of the labs are fine.

## 2018-02-26 NOTE — PROGRESS NOTES
Spoke with patients  ariel who is authorized on patients HIPAA release form. Discussed labs and also let him know that patient should be taking vitamin D once weekly. He voiced understanding and stated that she had been taking the vitamin D every 2 weeks but will change. He had no other concerns at the time of call.

## 2018-03-06 ENCOUNTER — OFFICE VISIT (OUTPATIENT)
Dept: INTERNAL MEDICINE CLINIC | Age: 43
End: 2018-03-06

## 2018-03-06 ENCOUNTER — DOCUMENTATION ONLY (OUTPATIENT)
Dept: INTERNAL MEDICINE CLINIC | Age: 43
End: 2018-03-06

## 2018-03-06 VITALS
WEIGHT: 185 LBS | DIASTOLIC BLOOD PRESSURE: 86 MMHG | SYSTOLIC BLOOD PRESSURE: 120 MMHG | BODY MASS INDEX: 29.03 KG/M2 | OXYGEN SATURATION: 98 % | HEART RATE: 71 BPM | TEMPERATURE: 97.4 F | RESPIRATION RATE: 16 BRPM | HEIGHT: 67 IN

## 2018-03-06 DIAGNOSIS — N39.490 OVERFLOW INCONTINENCE OF URINE: ICD-10-CM

## 2018-03-06 DIAGNOSIS — N30.01 ACUTE CYSTITIS WITH HEMATURIA: Primary | ICD-10-CM

## 2018-03-06 DIAGNOSIS — R29.898 WEAKNESS OF BOTH LEGS: ICD-10-CM

## 2018-03-06 DIAGNOSIS — G35 MULTIPLE SCLEROSIS (HCC): ICD-10-CM

## 2018-03-06 LAB
BILIRUB UR QL STRIP: NEGATIVE
GLUCOSE UR-MCNC: NEGATIVE MG/DL
KETONES P FAST UR STRIP-MCNC: NEGATIVE MG/DL
PH UR STRIP: 6 [PH] (ref 4.6–8)
PROT UR QL STRIP: NORMAL
SP GR UR STRIP: 1.02 (ref 1–1.03)
UA UROBILINOGEN AMB POC: NORMAL (ref 0.2–1)
URINALYSIS CLARITY POC: NORMAL
URINALYSIS COLOR POC: YELLOW
URINE BLOOD POC: NORMAL
URINE LEUKOCYTES POC: NORMAL
URINE NITRITES POC: POSITIVE

## 2018-03-06 RX ORDER — NITROFURANTOIN 25; 75 MG/1; MG/1
100 CAPSULE ORAL 2 TIMES DAILY
Qty: 14 CAP | Refills: 0 | Status: SHIPPED | OUTPATIENT
Start: 2018-03-06 | End: 2018-03-13

## 2018-03-06 NOTE — MR AVS SNAPSHOT
[address] [facility] [address] 
[phone] Patient: Osman Robles MRN: [id number] [employer] [facility] [employer]:[birth year] Visit Information Date & Time Provider Department Dept. Phone Encounter #  
 3/6/2018  2:45 PM Rosaura Jorge MD [facility] Internal Medicine [phone] [id number] Upcoming Health Maintenance Date Due DTaP/Tdap/Td series (1 - Tdap) 6/18/1994 PAP AKA CERVICAL CYTOLOGY 3/6/2021 Allergies as of 3/6/2018  Review Complete On: 3/6/2018 By: Bel Momin LPN Severity Noted Reaction Type Reactions Penicillins  07/12/2017    Rash Current Immunizations  Reviewed on 10/17/2017 Name Date Influenza Vaccine (Quad) PF 10/17/2017 Not reviewed this visit You Were Diagnosed With   
  
 Codes Comments Acute cystitis with hematuria    -  Primary ICD-10-CM: N30.01 
ICD-9-CM: 595.0 Overflow incontinence of urine     ICD-10-CM: N39.490 ICD-9-CM: 788.38 Multiple sclerosis ([facility]     ICD-10-CM: G35 
ICD-9-CM: 347 Weakness of both legs     ICD-10-CM: R29.898 ICD-9-CM: 729.89 Vitals BP Pulse Temp Resp Height(growth percentile) Weight(growth percentile) 120/86 (BP 1 Location: Left arm, BP Patient Position: Sitting) 71 97.4 °F (36.3 °C) (Oral) 16 5' 7\" (1.702 m) 185 lb (83.9 kg) LMP SpO2 BMI OB Status Smoking Status 02/13/2018 98% 28.98 kg/m2 Having regular periods Never Smoker BMI and BSA Data Body Mass Index Body Surface Area  
 28.98 kg/m 2 1.99 m 2 Preferred Pharmacy Pharmacy Name Phone [address] [phone] Your Updated Medication List  
  
   
This list is accurate as of 3/6/18  3:43 PM.  Always use your most recent med list.  
  
  
  
  
 baclofen 10 mg tablet Commonly known as:  LIORESAL Take 1 Tab by mouth two (2) times a day. ergocalciferol 50,000 unit capsule Commonly known as:  ERGOCALCIFEROL Take 1 Cap by mouth every seven (7) days for 30 doses. multivitamin tablet Commonly known as:  ONE A DAY Take 1 Tab by mouth daily. nitrofurantoin (macrocrystal-monohydrate) 100 mg capsule Commonly known as:  MACROBID Take 1 Cap by mouth two (2) times a day for 7 days. TECFIDERA 240 mg Cpdr  
Generic drug:  dimethyl fumarate Take  by mouth. Prescriptions Sent to Pharmacy Refills  
 nitrofurantoin, macrocrystal-monohydrate, (MACROBID) 100 mg capsule 0 Sig: Take 1 Cap by mouth two (2) times a day for 7 days. Class: Normal  
 Pharmacy: [facility] [address] [facility] [address] [facility] #: [phone] Route: Oral  
  
We Performed the Following AMB POC URINALYSIS DIP STICK AUTO W/O MICRO [29936 CPT(R)] CULTURE, URINE [id number] CPT(R)] [address] Comments:  
 Needs PT & assessment for chairlift at home. Referral Information Referral ID Referred By Referred To  
  
 [id number], [address] Phone: [phone] Fax: [phone] Visits Status Start Date End Date 1 New Request 3/6/18 3/6/19 If your referral has a status of pending review or denied, additional information will be sent to support the outcome of this decision. Introducing [facility]! Dear Donel Back: Thank you for requesting a [employer] account. Our records indicate that you already have an active Client[employer] account. You can access your account anytime at [email or web address] Did you know that you can access your hospital and ER discharge instructions at any time in Client[employer]? You can also review all of your test results from your hospital stay or ER visit. Additional Information If you have questions, please visit the Frequently Asked Questions section of the [employer] website at [email or web address]). [email or web address] Remember, [employer] is NOT to be used for urgent needs. For medical emergencies, dial 911. Now available from your iPhone and Android! Please provide this summary of care documentation to your next provider. Your primary care clinician is listed as Ke Bernardo. If you have any questions after today's visit, please call [phone].

## 2018-03-06 NOTE — PROGRESS NOTES
Referral to [facility], demographics, insurance information, and last office visit faxed to [facility] at [phone].

## 2018-03-06 NOTE — PROGRESS NOTES
Written by Chandu Bains, as dictated by Dr. Rowe Crigler, MD.    Wanda Bliss is a 38 y.o. female. HPI  The patient comes in today to request a referral for PT due to increase weakness in her legs. Her family is interested in a stair lift for her wheelchair as her  and daughter are having a hard time bringing her from where she stays downstairs to her shower upstairs, and she has been sleeping on the sofa. She has had a stair lift before, but it was donated and not purchased through insurance. It broke few weeks ago & family has been carrying her upstairs. UA in the office today showed large leukocytes, positive nitrites, and trace blood. She has been experiencing burning with urination, and she provided this sample from home. In the past she has had UTIs with ESBL, but has not responded to oral abx and had to be given IV abx. Patient Active Problem List   Diagnosis Code    Urinary incontinence R32    Fatigue R53.83    Constipation K59.00    Multiple sclerosis ([facility] G35        Current Outpatient Prescriptions on File Prior to Visit   Medication Sig Dispense Refill    ergocalciferol (ERGOCALCIFEROL) 50,000 unit capsule Take 1 Cap by mouth every seven (7) days for 30 doses. 30 Cap 0    dimethyl fumarate (TECFIDERA) 240 mg cpDR Take  by mouth.  baclofen (LIORESAL) 10 mg tablet Take 1 Tab by mouth two (2) times a day. 90 Tab 3    multivitamin (ONE A DAY) tablet Take 1 Tab by mouth daily. No current facility-administered medications on file prior to visit.         Allergies   Allergen Reactions    Penicillins Rash       Past Medical History:   Diagnosis Date    Constipation 6/24/2009    Fatigue 6/24/2009    MS (multiple sclerosis) ([facility]     Urinary incontinence 6/24/2009       Past Surgical History:   Procedure Laterality Date    HX GYN      tubal ligation       Family History   Problem Relation Age of Onset    Liver Disease Mother Social History     Social History    Marital status:      Spouse name: N/A    Number of children: N/A    Years of education: N/A     Occupational History    Not on file. Social History Main Topics    Smoking status: Never Smoker    Smokeless tobacco: Never Used    Alcohol use No    Drug use: No    Sexual activity: Yes     Partners: Male      Comment:      Other Topics Concern    Not on file     Social History Narrative       Office Visit on 02/21/2018   Component Date Value Ref Range Status    TSH 02/21/2018 2.910  0.450 - 4.500 uIU/mL Final    Glucose 02/21/2018 76  65 - 99 mg/dL Final    BUN 02/21/2018 13  6 - 24 mg/dL Final    Creatinine 02/21/2018 0.42* 0.57 - 1.00 mg/dL Final    GFR est non-AA 02/21/2018 125  >59 Final    GFR est AA 02/21/2018 144  >59 Final    BUN/Creatinine ratio 02/21/2018 31* 9 - 23 Final    Sodium 02/21/2018 143  134 - 144 mmol/L Final    Potassium 02/21/2018 4.0  3.5 - 5.2 mmol/L Final    Chloride 02/21/2018 102  96 - 106 mmol/L Final    CO2 02/21/2018 24  18 - 29 mmol/L Final    Calcium 02/21/2018 9.0  8.7 - 10.2 mg/dL Final    Protein, total 02/21/2018 6.8  6.0 - 8.5 g/dL Final    Albumin 02/21/2018 4.0  3.5 - 5.5 g/dL Final    GLOBULIN, TOTAL 02/21/2018 2.8  1.5 - 4.5 Final    A-G Ratio 02/21/2018 1.4  1.2 - 2.2 Final    Bilirubin, total 02/21/2018 <0.2  0.0 - 1.2 mg/dL Final    Alk.  phosphatase 02/21/2018 76  39 - 117 IU/L Final    AST (SGOT) 02/21/2018 11  0 - 40 IU/L Final    ALT (SGPT) 02/21/2018 12  0 - 32 IU/L Final    WBC 02/21/2018 5.3  3.4 - 10.8 x10E3/uL Final    RBC 02/21/2018 4.36  3.77 - 5.28 x10E6/uL Final    HGB 02/21/2018 11.4  11.1 - 15.9 g/dL Final    HCT 02/21/2018 35.7  34.0 - 46.6 % Final    MCV 02/21/2018 82  79 - 97 fL Final    MCH 02/21/2018 26.1* 26.6 - 33.0 pg Final    MCHC 02/21/2018 31.9  31.5 - 35.7 g/dL Final    RDW 02/21/2018 14.9  12.3 - 15.4 % Final    PLATELET 23/22/3106 264  150 - 379 x10E3/uL Final    VITAMIN D, 25-HYDROXY 02/21/2018 17.1* 30.0 - 100.0 ng/mL Final         Review of Systems   Constitutional: Negative for malaise/fatigue. HENT: Negative for congestion. Respiratory: Negative for cough and shortness of breath. Genitourinary: Positive for dysuria. Negative for frequency and urgency. Musculoskeletal: Negative for joint pain and myalgias. Neurological: Negative for weakness. Visit Vitals    /86 (BP 1 Location: Left arm, BP Patient Position: Sitting)    Pulse 71    Temp 97.4 °F (36.3 °C) (Oral)    Resp 16    Ht 5' 7\" (1.702 m)    Wt 185 lb (83.9 kg)    LMP 02/13/2018    SpO2 98%    BMI 28.98 kg/m2       Physical Exam   Constitutional: She is oriented to person, place, and time. She appears well-developed and well-nourished. No distress. Wheelchair-bound   HENT:   Right Ear: External ear normal.   Left Ear: External ear normal.   Eyes: Conjunctivae and EOM are normal.   Neck: Normal range of motion. Neck supple. Cardiovascular: Normal rate and regular rhythm. Pulmonary/Chest: Effort normal and breath sounds normal. She has no wheezes. Abdominal: Soft. Bowel sounds are normal.   Musculoskeletal: She exhibits no tenderness. Muscle strength 4/5 in both lower extremities. Neurological: She is alert and oriented to person, place, and time. Psychiatric: She has a normal mood and affect. Her behavior is normal.   Nursing note and vitals reviewed. ASSESSMENT and PLAN    ICD-10-CM ICD-9-CM    1. Acute cystitis with hematuria N30.01 595.0 nitrofurantoin, macrocrystal-monohydrate, (MACROBID) 100 mg capsule sent to pharmacy      CULTURE, URINE   2. Overflow incontinence of urine N39.490 788.38 AMB POC URINALYSIS DIP STICK AUTO W/O MICRO    UA showed large leukocytes, positive nitrites, and trace blood. Urine culture ordered. I want her to start on Macrobid for 7 days for now, but I can change the abx if necessary based on urine culture results. 3. Multiple sclerosis ([facility] G35 [address]   4. Weakness of both legs R29.898 729.89 REFERRAL TO HOME HEALTH    Referred to home health, with whom they can discuss getting a chair lift for her. This plan was reviewed with the patient and patient agrees. All questions were answered. This scribe documentation was reviewed by me and accurately reflects the examination and decisions made by me. This note will not be viewable in [address]

## 2018-03-06 NOTE — PROGRESS NOTES
Chief Complaint   Patient presents with    Referral Request     PT and stair lift chair     Visit Vitals    /86 (BP 1 Location: Left arm, BP Patient Position: Sitting)    Pulse 71    Temp 97.4 °F (36.3 °C) (Oral)    Resp 16    Ht 5' 7\" (1.702 m)    Wt 185 lb (83.9 kg)    SpO2 98%    BMI 28.98 kg/m2     1. Have you been to the ER, urgent care clinic since your last visit? Hospitalized since your last visit? No    2. Have you seen or consulted any other health care providers outside of the [address] [place] [address] since your last visit? Include any pap smears or colon screening.  No(Neurology next week)

## 2018-03-10 LAB
BACTERIA UR CULT: ABNORMAL
BACTERIA UR CULT: ABNORMAL

## 2018-03-14 ENCOUNTER — TELEPHONE (OUTPATIENT)
Dept: INTERNAL MEDICINE CLINIC | Age: 43
End: 2018-03-14

## 2018-03-15 NOTE — TELEPHONE ENCOUNTER
is inquiring about chair lift for the stairs so that he can get his wife up the steps to bathe. He is also inquiring about physical therapy for wife. I see that home health that was ordered at last visit has been cancelled.   What do I need to do to help

## 2018-03-15 NOTE — TELEPHONE ENCOUNTER
Confirmed speaking to the . Medicare is aware that the lift chair is broken. Medicare has been to the house taken pictures and states to  they will be getting back in touch with him soon about the approval of a new lift.  does not have the phone number of contact but will look for it and call with the number. He is also expecting a call from them by Friday of this week.

## 2018-03-15 NOTE — TELEPHONE ENCOUNTER
She had a chair lift but broke few weeks ago & they needed a replacement. Do we have to do everything all over again?

## 2018-03-20 ENCOUNTER — TELEPHONE (OUTPATIENT)
Dept: INTERNAL MEDICINE CLINIC | Age: 43
End: 2018-03-20

## 2018-03-20 DIAGNOSIS — G35 MULTIPLE SCLEROSIS (HCC): Primary | ICD-10-CM

## 2018-03-20 DIAGNOSIS — R29.898 WEAKNESS OF BOTH LEGS: ICD-10-CM

## 2018-03-20 NOTE — TELEPHONE ENCOUNTER
Spoke to [address] they stated we sent referral to [facility]. Called Rani spoke to Kolby is checking on referral and will call back.

## 2018-03-20 NOTE — TELEPHONE ENCOUNTER
Spoke to [facility] [employer] and [email or web address] [facility] they do not take patient's insurance. Called back to [facility] they can take the patient now but they need a new referral put in the system.

## 2018-03-21 ENCOUNTER — HOME HEALTH ADMISSION (OUTPATIENT)
Dept: HOME HEALTH SERVICES | Facility: HOME HEALTH | Age: 43
End: 2018-03-21
Payer: COMMERCIAL

## 2018-03-22 ENCOUNTER — TELEPHONE (OUTPATIENT)
Dept: INTERNAL MEDICINE CLINIC | Age: 43
End: 2018-03-22

## 2018-03-23 ENCOUNTER — TELEPHONE (OUTPATIENT)
Dept: INTERNAL MEDICINE CLINIC | Age: 43
End: 2018-03-23

## 2018-03-23 ENCOUNTER — HOME CARE VISIT (OUTPATIENT)
Dept: SCHEDULING | Facility: HOME HEALTH | Age: 43
End: 2018-03-23
Payer: COMMERCIAL

## 2018-03-23 VITALS
SYSTOLIC BLOOD PRESSURE: 122 MMHG | TEMPERATURE: 97.8 F | DIASTOLIC BLOOD PRESSURE: 70 MMHG | HEART RATE: 78 BPM | OXYGEN SATURATION: 98 %

## 2018-03-23 PROCEDURE — G0151 HHCP-SERV OF PT,EA 15 MIN: HCPCS

## 2018-03-23 PROCEDURE — 400013 HH SOC

## 2018-03-23 NOTE — TELEPHONE ENCOUNTER
Pt prescribed to vitamin d- cannot take it due to Buddhist reasons due to the coating on the outside. Can it be prescribed in a different method?

## 2018-03-23 NOTE — TELEPHONE ENCOUNTER
She can take Over the counter vitamin D 2000 I.U daily dose in a tablet form or order from [facility] without Gelatin. Prescription vitamin D is in gel form only.

## 2018-03-23 NOTE — TELEPHONE ENCOUNTER
Advised:Home Healtj  She can take Over the counter vitamin D 2000 I.U daily dose in a tablet form or order from [facility] without Gelatin. Prescription vitamin D is in gel form only.         Farshad Hall will contact patient.

## 2018-03-26 ENCOUNTER — HOME CARE VISIT (OUTPATIENT)
Dept: SCHEDULING | Facility: HOME HEALTH | Age: 43
End: 2018-03-26
Payer: COMMERCIAL

## 2018-03-26 VITALS
HEART RATE: 78 BPM | RESPIRATION RATE: 16 BRPM | OXYGEN SATURATION: 98 % | DIASTOLIC BLOOD PRESSURE: 60 MMHG | SYSTOLIC BLOOD PRESSURE: 118 MMHG | TEMPERATURE: 97.6 F

## 2018-03-26 PROCEDURE — G0151 HHCP-SERV OF PT,EA 15 MIN: HCPCS

## 2018-03-29 ENCOUNTER — HOME CARE VISIT (OUTPATIENT)
Dept: SCHEDULING | Facility: HOME HEALTH | Age: 43
End: 2018-03-29
Payer: COMMERCIAL

## 2018-03-29 VITALS
HEART RATE: 78 BPM | TEMPERATURE: 98.4 F | SYSTOLIC BLOOD PRESSURE: 108 MMHG | OXYGEN SATURATION: 99 % | DIASTOLIC BLOOD PRESSURE: 70 MMHG | RESPIRATION RATE: 16 BRPM

## 2018-03-29 PROCEDURE — G0151 HHCP-SERV OF PT,EA 15 MIN: HCPCS

## 2018-04-02 ENCOUNTER — HOME CARE VISIT (OUTPATIENT)
Dept: SCHEDULING | Facility: HOME HEALTH | Age: 43
End: 2018-04-02
Payer: COMMERCIAL

## 2018-04-02 PROCEDURE — G0151 HHCP-SERV OF PT,EA 15 MIN: HCPCS

## 2018-04-04 VITALS
SYSTOLIC BLOOD PRESSURE: 124 MMHG | OXYGEN SATURATION: 99 % | HEART RATE: 90 BPM | DIASTOLIC BLOOD PRESSURE: 80 MMHG | RESPIRATION RATE: 16 BRPM | TEMPERATURE: 98.1 F

## 2018-04-05 ENCOUNTER — HOME CARE VISIT (OUTPATIENT)
Dept: SCHEDULING | Facility: HOME HEALTH | Age: 43
End: 2018-04-05
Payer: COMMERCIAL

## 2018-04-05 PROCEDURE — G0151 HHCP-SERV OF PT,EA 15 MIN: HCPCS

## 2018-04-06 VITALS
TEMPERATURE: 97.7 F | OXYGEN SATURATION: 99 % | SYSTOLIC BLOOD PRESSURE: 118 MMHG | HEART RATE: 78 BPM | RESPIRATION RATE: 16 BRPM | DIASTOLIC BLOOD PRESSURE: 80 MMHG

## 2018-04-09 ENCOUNTER — HOME CARE VISIT (OUTPATIENT)
Dept: SCHEDULING | Facility: HOME HEALTH | Age: 43
End: 2018-04-09
Payer: COMMERCIAL

## 2018-04-09 VITALS
RESPIRATION RATE: 16 BRPM | TEMPERATURE: 97.8 F | DIASTOLIC BLOOD PRESSURE: 80 MMHG | OXYGEN SATURATION: 99 % | SYSTOLIC BLOOD PRESSURE: 120 MMHG | HEART RATE: 80 BPM

## 2018-04-09 PROCEDURE — G0151 HHCP-SERV OF PT,EA 15 MIN: HCPCS

## 2018-04-11 ENCOUNTER — HOME CARE VISIT (OUTPATIENT)
Dept: SCHEDULING | Facility: HOME HEALTH | Age: 43
End: 2018-04-11
Payer: COMMERCIAL

## 2018-04-11 VITALS
SYSTOLIC BLOOD PRESSURE: 122 MMHG | TEMPERATURE: 98.2 F | RESPIRATION RATE: 16 BRPM | DIASTOLIC BLOOD PRESSURE: 80 MMHG | OXYGEN SATURATION: 98 % | HEART RATE: 90 BPM

## 2018-04-11 PROCEDURE — G0151 HHCP-SERV OF PT,EA 15 MIN: HCPCS

## 2018-04-16 ENCOUNTER — HOME CARE VISIT (OUTPATIENT)
Dept: SCHEDULING | Facility: HOME HEALTH | Age: 43
End: 2018-04-16
Payer: COMMERCIAL

## 2018-04-16 PROCEDURE — G0151 HHCP-SERV OF PT,EA 15 MIN: HCPCS

## 2018-04-17 VITALS
SYSTOLIC BLOOD PRESSURE: 122 MMHG | HEART RATE: 70 BPM | OXYGEN SATURATION: 98 % | TEMPERATURE: 98 F | DIASTOLIC BLOOD PRESSURE: 70 MMHG | RESPIRATION RATE: 16 BRPM

## 2018-05-07 ENCOUNTER — OFFICE VISIT (OUTPATIENT)
Dept: INTERNAL MEDICINE CLINIC | Age: 43
End: 2018-05-07

## 2018-05-07 VITALS
OXYGEN SATURATION: 95 % | TEMPERATURE: 97.5 F | BODY MASS INDEX: 29.03 KG/M2 | HEART RATE: 85 BPM | DIASTOLIC BLOOD PRESSURE: 82 MMHG | HEIGHT: 67 IN | WEIGHT: 185 LBS | SYSTOLIC BLOOD PRESSURE: 110 MMHG | RESPIRATION RATE: 16 BRPM

## 2018-05-07 DIAGNOSIS — M79.672 HEEL PAIN, BILATERAL: ICD-10-CM

## 2018-05-07 DIAGNOSIS — G35 MULTIPLE SCLEROSIS (HCC): Primary | ICD-10-CM

## 2018-05-07 DIAGNOSIS — R29.898 WEAKNESS OF BOTH LEGS: ICD-10-CM

## 2018-05-07 DIAGNOSIS — M79.671 HEEL PAIN, BILATERAL: ICD-10-CM

## 2018-05-07 RX ORDER — DICLOFENAC SODIUM 10 MG/G
2 GEL TOPICAL 4 TIMES DAILY
Qty: 3 EACH | Refills: 0 | Status: SHIPPED | OUTPATIENT
Start: 2018-05-07 | End: 2018-07-20 | Stop reason: SDUPTHER

## 2018-05-07 NOTE — MR AVS SNAPSHOT
[address] [facility] [address] [place] [address] [address] [place] [address] 
[phone] Patient: Maria D Christianson MRN: [id number] CER:[birth year]18 Visit Information Date & Time Provider Department Dept. Phone Encounter #  
 5/7/2018  2:45 PM Radha Harvey MD [facility] Internal Medicine [phone] [id number] Upcoming Health Maintenance Date Due DTaP/Tdap/Td series (1 - Tdap) 6/18/1994 Influenza Age 5 to Adult 8/1/2018 PAP AKA CERVICAL CYTOLOGY 3/6/2021 Allergies as of 5/7/2018  Review Complete On: 5/7/2018 By: Radha Harvey MD  
  
 Severity Noted Reaction Type Reactions Penicillins  07/12/2017    Rash Current Immunizations  Reviewed on 10/17/2017 Name Date Influenza Vaccine (Quad) PF 10/17/2017 Not reviewed this visit You Were Diagnosed With   
  
 Codes Comments Multiple sclerosis ([facility]    -  Primary ICD-10-CM: G35 
ICD-9-CM: 804 Weakness of both legs     ICD-10-CM: R29.898 ICD-9-CM: 729.89 Heel pain, bilateral     ICD-10-CM: M79.671, M77.213 ICD-9-CM: 729.5 Vitals BP Pulse Temp Resp Height(growth percentile) Weight(growth percentile) 110/82 (BP 1 Location: Right arm, BP Patient Position: Sitting) 85 97.5 °F (36.4 °C) (Oral) 16 5' 7\" (1.702 m) 185 lb (83.9 kg) LMP SpO2 BMI OB Status Smoking Status 05/07/2018 (Exact Date) 95% 28.98 kg/m2 Having regular periods Never Smoker Vitals History BMI and BSA Data Body Mass Index Body Surface Area  
 28.98 kg/m 2 1.99 m 2 Preferred Pharmacy Pharmacy Name Phone [address] [place] [address], [address] [place] [address] Rd. [phone] Your Updated Medication List  
  
   
This list is accurate as of 5/7/18  3:24 PM.  Always use your most recent med list.  
  
  
  
  
 baclofen 10 mg tablet Commonly known as:  LIORESAL Take 1 Tab by mouth two (2) times a day. diclofenac 1 % Gel Commonly known as:  VOLTAREN  
 Apply 2 g to affected area four (4) times daily for 30 days. ergocalciferol 50,000 unit capsule Commonly known as:  ERGOCALCIFEROL Take 1 Cap by mouth every seven (7) days for 30 doses. multivitamin tablet Commonly known as:  ONE A DAY Take 1 Tab by mouth daily. TECFIDERA 240 mg Cpdr  
Generic drug:  dimethyl fumarate Take 1 Tab by mouth two (2) times a day. Prescriptions Sent to Pharmacy Refills  
 diclofenac (VOLTAREN) 1 % gel 0 Sig: Apply 2 g to affected area four (4) times daily for 30 days. Class: Normal  
 Pharmacy: [address] [facility] [facility] [facility] #: [phone] Route: Topical  
  
Introducing [facility]! Dear Naveen Ramirez: Thank you for requesting a [employer] account. Our records indicate that you already have an active [employer] account. You can access your account anytime at [email or web address] Did you know that you can access your hospital and ER discharge instructions at any time in [employer]? You can also review all of your test results from your hospital stay or ER visit. Additional Information If you have questions, please visit the Frequently Asked Questions section of the [employer] website at [email or web address] Remember, [employer] is NOT to be used for urgent needs. For medical emergencies, dial 911. Now available from your iPhone and Android! Please provide this summary of care documentation to your next provider. Your primary care clinician is listed as Ilene Rosado. If you have any questions after today's visit, please call [phone].

## 2018-05-07 NOTE — PROGRESS NOTES
Dev Quiroz is a 38 y.o. female    Chief Complaint   Patient presents with    Foot Pain     right heel pain X 2 weeks. Pt states the pain is worse at night and barable during the day     Other     pt needs a prescription for a smaller wheelchair so she can fit through doors of the bathroom     Other     pt was given a vitmain D prescription but it was a gel tab and pt cant take gel tabs so she needs another form        1. Have you been to the ER, urgent care clinic since your last visit? Hospitalized since your last visit? No    2. Have you seen or consulted any other health care providers outside of the [address] [place] [address] since your last visit? Include any pap smears or colon screening.   no    Visit Vitals    /82 (BP 1 Location: Right arm, BP Patient Position: Sitting)    Pulse 85    Temp 97.5 °F (36.4 °C) (Oral)    Resp 16    Ht 5' 7\" (1.702 m)    Wt 185 lb (83.9 kg)    LMP 05/07/2018 (Exact Date)    SpO2 95%    BMI 28.98 kg/m2

## 2018-05-07 NOTE — PROGRESS NOTES
Written by Sukumar Amaro, as dictated by Dr. Candy Randall MD.    Kim Ulloa is a 38 y.o. female. HPI  The patient comes in today c/o BL foot pain under her heels, worse in her R heel. Her  has requested a smaller wheelchair for their house to make it easier to transport her around their house and into her bathroom. They are still working to gain approval for a chair lift in their house, though it has not been installed yet. Patient requires a smaller size wheelchair to be mobile in their home environment. Patient lives with family and is not able to ambulate with rolling walker and needs assistance to be wheeled around her house. Patient is not able to perform functional ambulation with walker due to her MS. Patient will need rear anti tippers to prevent tipping in wheelchair. Patient Active Problem List   Diagnosis Code    Urinary incontinence R32    Fatigue R53.83    Constipation K59.00    Multiple sclerosis ([facility] [address] G35        Current Outpatient Prescriptions on File Prior to Visit   Medication Sig Dispense Refill    ergocalciferol (ERGOCALCIFEROL) 50,000 unit capsule Take 1 Cap by mouth every seven (7) days for 30 doses. 30 Cap 0    dimethyl fumarate (TECFIDERA) 240 mg cpDR Take 1 Tab by mouth two (2) times a day.  baclofen (LIORESAL) 10 mg tablet Take 1 Tab by mouth two (2) times a day. (Patient taking differently: Take 1.5 Tabs by mouth three (3) times daily.) 90 Tab 3    multivitamin (ONE A DAY) tablet Take 1 Tab by mouth daily. No current facility-administered medications on file prior to visit.         Allergies   Allergen Reactions    Penicillins Rash       Past Medical History:   Diagnosis Date    Constipation 6/24/2009    Fatigue 6/24/2009    MS (multiple sclerosis) ([facility] [address]     Urinary incontinence 6/24/2009       Past Surgical History:   Procedure Laterality Date    HX GYN      tubal ligation       Family History Problem Relation Age of Onset    Liver Disease Mother        Social History     Social History    Marital status:      Spouse name: N/A    Number of children: N/A    Years of education: N/A     Occupational History    Not on file. Social History Main Topics    Smoking status: Never Smoker    Smokeless tobacco: Never Used    Alcohol use No    Drug use: No    Sexual activity: Yes     Partners: Male      Comment:      Other Topics Concern    Not on file     Social History Narrative       Office Visit on 03/06/2018   Component Date Value Ref Range Status    Color (UA POC) 03/06/2018 Yellow   Final    Clarity (UA POC) 03/06/2018 Slightly Cloudy   Final    Glucose (UA POC) 03/06/2018 Negative  Negative Final    Bilirubin (UA POC) 03/06/2018 Negative  Negative Final    Ketones (UA POC) 03/06/2018 Negative  Negative Final    Specific gravity (UA POC) 03/06/2018 1.025  1.001 - 1.035 Final    Blood (UA POC) 03/06/2018 Trace  Negative Final    pH (UA POC) 03/06/2018 6.0  4.6 - 8.0 Final    Protein (UA POC) 03/06/2018 Trace  Negative Final    Urobilinogen (UA POC) 03/06/2018 0.2 mg/dL  0.2 - 1 Final    Nitrites (UA POC) 03/06/2018 Positive  Negative Final    Leukocyte esterase (UA POC) 03/06/2018 3+  Negative Final    Urine Culture, Routine 03/06/2018 *  Final                    Value:Escherichia coli  Identified by an automated biochemical system.       Greater than 100,000 colony forming units per mL    Urine Culture, Routine 03/06/2018    Final                    Value:Mixed urogenital akira  50,000-100,000 colony forming units per mL     Office Visit on 02/21/2018   Component Date Value Ref Range Status    TSH 02/21/2018 2.910  0.450 - 4.500 uIU/mL Final    Glucose 02/21/2018 76  65 - 99 mg/dL Final    BUN 02/21/2018 13  6 - 24 mg/dL Final    Creatinine 02/21/2018 0.42* 0.57 - 1.00 mg/dL Final    GFR est non-AA 02/21/2018 125  >59 Final    GFR est AA 02/21/2018 144  >59 Final    BUN/Creatinine ratio 02/21/2018 31* 9 - 23 Final    Sodium 02/21/2018 143  134 - 144 mmol/L Final    Potassium 02/21/2018 4.0  3.5 - 5.2 mmol/L Final    Chloride 02/21/2018 102  96 - 106 mmol/L Final    CO2 02/21/2018 24  18 - 29 mmol/L Final    Calcium 02/21/2018 9.0  8.7 - 10.2 mg/dL Final    Protein, total 02/21/2018 6.8  6.0 - 8.5 g/dL Final    Albumin 02/21/2018 4.0  3.5 - 5.5 g/dL Final    GLOBULIN, TOTAL 02/21/2018 2.8  1.5 - 4.5 Final    A-G Ratio 02/21/2018 1.4  1.2 - 2.2 Final    Bilirubin, total 02/21/2018 <0.2  0.0 - 1.2 mg/dL Final    Alk. phosphatase 02/21/2018 76  39 - 117 IU/L Final    AST (SGOT) 02/21/2018 11  0 - 40 IU/L Final    ALT (SGPT) 02/21/2018 12  0 - 32 IU/L Final    WBC 02/21/2018 5.3  3.4 - 10.8 x10E3/uL Final    RBC 02/21/2018 4.36  3.77 - 5.28 x10E6/uL Final    HGB 02/21/2018 11.4  11.1 - 15.9 g/dL Final    HCT 02/21/2018 35.7  34.0 - 46.6 % Final    MCV 02/21/2018 82  79 - 97 fL Final    MCH 02/21/2018 26.1* 26.6 - 33.0 pg Final    MCHC 02/21/2018 31.9  31.5 - 35.7 g/dL Final    RDW 02/21/2018 14.9  12.3 - 15.4 % Final    PLATELET 76/48/0604 647  150 - 379 x10E3/uL Final    VITAMIN D, 25-HYDROXY 02/21/2018 17.1* 30.0 - 100.0 ng/mL Final       Review of Systems   Constitutional: Negative for malaise/fatigue. HENT: Negative for congestion. Respiratory: Negative for cough and shortness of breath. Musculoskeletal: Negative for joint pain and myalgias. Neurological: Positive for weakness. Psychiatric/Behavioral: Negative for depression, memory loss and substance abuse. Visit Vitals    /82 (BP 1 Location: Right arm, BP Patient Position: Sitting)    Pulse 85    Temp 97.5 °F (36.4 °C) (Oral)    Resp 16    Ht 5' 7\" (1.702 m)    Wt 185 lb (83.9 kg)    LMP 05/07/2018 (Exact Date)    SpO2 95%    BMI 28.98 kg/m2       Physical Exam   Constitutional: She is oriented to person, place, and time.  She appears well-developed and well-nourished. No distress. HENT:   Right Ear: External ear normal.   Left Ear: External ear normal.   Eyes: Conjunctivae and EOM are normal. Right eye exhibits no discharge. Left eye exhibits no discharge. Neck: Normal range of motion. Neck supple. Cardiovascular: Normal rate and regular rhythm. Pulmonary/Chest: Effort normal and breath sounds normal. She has no wheezes. Abdominal: Soft. Bowel sounds are normal. There is no tenderness. Musculoskeletal: She exhibits tenderness. Tenderness under R heel   Lymphadenopathy:     She has no cervical adenopathy. Neurological: She is alert and oriented to person, place, and time. Skin: She is not diaphoretic. Psychiatric: She has a normal mood and affect. Her behavior is normal.   Nursing note and vitals reviewed. ASSESSMENT and PLAN    ICD-10-CM ICD-9-CM    1. Multiple sclerosis ([facility] [address] G35 340 Pt's family is still working to have a chair lift installed in their house. 2. Weakness of both legs R29.898 729.89 Script given for smaller wheelchair for their house. 3. Heel pain, bilateral M79.671 729.5 diclofenac (VOLTAREN) 1 % gel sent to pharmacy    Diclofenac gel given which she can apply prn. Discussed she could see a podiatrist for further workup but recommended she start with this. R00.198          This plan was reviewed with the patient and patient agrees. All questions were answered. This scribe documentation was reviewed by me and accurately reflects the examination and decisions made by me. This note will not be viewable in [address]

## 2018-06-04 ENCOUNTER — TELEPHONE (OUTPATIENT)
Dept: INTERNAL MEDICINE CLINIC | Age: 43
End: 2018-06-04

## 2018-06-04 NOTE — TELEPHONE ENCOUNTER
Pts  called to follow up with the wheelchair order that needs a signature- please call her    Fax paperwork to here  [phone]

## 2018-07-20 DIAGNOSIS — M79.672 HEEL PAIN, BILATERAL: ICD-10-CM

## 2018-07-20 DIAGNOSIS — M62.838 MUSCLE SPASM: Primary | ICD-10-CM

## 2018-07-20 DIAGNOSIS — M79.671 HEEL PAIN, BILATERAL: ICD-10-CM

## 2018-07-20 NOTE — TELEPHONE ENCOUNTER
Last refill:4/10/13  Last lab:3/6/18  Last Ov:5/7/18    Pharmacy:    [facility]    Pt is out of medicine

## 2018-07-23 RX ORDER — BACLOFEN 10 MG/1
10 TABLET ORAL 2 TIMES DAILY
Qty: 90 TAB | Refills: 3 | Status: SHIPPED | OUTPATIENT
Start: 2018-07-23 | End: 2019-09-25 | Stop reason: SDUPTHER

## 2018-07-23 RX ORDER — DICLOFENAC SODIUM 10 MG/G
GEL TOPICAL
Qty: 100 G | Refills: 0 | Status: SHIPPED | OUTPATIENT
Start: 2018-07-23 | End: 2018-08-22

## 2018-08-09 ENCOUNTER — OFFICE VISIT (OUTPATIENT)
Dept: INTERNAL MEDICINE CLINIC | Age: 43
End: 2018-08-09

## 2018-08-09 VITALS
HEIGHT: 67 IN | SYSTOLIC BLOOD PRESSURE: 108 MMHG | HEART RATE: 96 BPM | RESPIRATION RATE: 16 BRPM | TEMPERATURE: 97.4 F | OXYGEN SATURATION: 98 % | DIASTOLIC BLOOD PRESSURE: 72 MMHG

## 2018-08-09 DIAGNOSIS — G35 MULTIPLE SCLEROSIS (HCC): ICD-10-CM

## 2018-08-09 DIAGNOSIS — N30.00 ACUTE CYSTITIS WITHOUT HEMATURIA: Primary | ICD-10-CM

## 2018-08-09 RX ORDER — METHENAMINE HIPPURATE 1000 MG/1
1 TABLET ORAL 2 TIMES DAILY WITH MEALS
Qty: 60 TAB | Refills: 0 | Status: SHIPPED | OUTPATIENT
Start: 2018-08-09 | End: 2018-09-08

## 2018-08-09 NOTE — MR AVS SNAPSHOT
[facility] [address] 
[phone] Patient: Yana Smith MRN: [id number] YCI:[id number] Visit Information Date & Time Provider Department Dept. Phone Encounter #  
 8/9/2018 12:00 PM Luan Alston, [address] [facility],[address] Internal Medicine [phone] [id number] Upcoming Health Maintenance Date Due DTaP/Tdap/Td series (1 - Tdap) 6/18/1994 Influenza Age 5 to Adult 8/1/2018 PAP AKA CERVICAL CYTOLOGY 3/6/2021 Allergies as of 8/9/2018  Review Complete On: 8/9/2018 By: Luan Alston MD  
  
 Severity Noted Reaction Type Reactions Penicillins  07/12/2017    Rash Current Immunizations  Reviewed on 10/17/2017 Name Date Influenza Vaccine (Quad) PF 10/17/2017 Not reviewed this visit You Were Diagnosed With   
  
 Codes Comments Acute cystitis without hematuria    -  Primary ICD-10-CM: N30.00 ICD-9-CM: 595.0 [facility]     ICD-10-CM: G35 
ICD-9-CM: 374 Vitals BP Pulse Temp Resp Height(growth percentile) LMP  
 108/72 (BP 1 Location: Right arm, BP Patient Position: Sitting) 96 97.4 °F (36.3 °C) (Oral) 16 5' 7\" (1.702 m) 06/02/2018 SpO2 OB Status Smoking Status 98% Having regular periods Never Smoker Preferred Pharmacy Pharmacy Name Phone [address] [phone] Your Updated Medication List  
  
   
This list is accurate as of 8/9/18 12:47 PM.  Always use your most recent med list.  
  
  
  
  
 baclofen 10 mg tablet Commonly known as:  LIORESAL Take 1 Tab by mouth two (2) times a day. diclofenac 1 % Gel Commonly known as:  VOLTAREN  
APPLY 2 GRAMS TOPICALLY TO AFFECTED AREA(S) 4 TIMES DAILY FOR 30 DAYS  
  
 methenamine hippurate 1 gram tablet Commonly known as:  HIPREX Take 1 Tab by mouth two (2) times daily (with meals) for 30 days. multivitamin tablet Commonly known as:  ONE A DAY Take 1 Tab by mouth daily. TECFIDERA 240 mg Cpdr  
Generic drug:  dimethyl fumarate Take 1 Tab by mouth two (2) times a day. Prescriptions Sent to Pharmacy Refills  
 methenamine hippurate (HIPREX) 1 gram tablet 0 Sig: Take 1 Tab by mouth two (2) times daily (with meals) for 30 days. Class: Normal  
 Pharmacy: [facility] [address] [facility] [facility] #: [phone] Route: Oral  
  
Introducing [facility] SERVICES! Dear Leatha Amaya: Thank you for requesting a Kabam account. Our records indicate that you already have an active Kabam account. You can access your account anytime at [email or web address] Did you know that you can access your hospital and ER discharge instructions at any time in Kabam? You can also review all of your test results from your hospital stay or ER visit. Additional Information If you have questions, please visit the Frequently Asked Questions section of the Kabam website at [email or web address] Remember, Kabam is NOT to be used for urgent needs. For medical emergencies, dial 911. Now available from your iPhone and Android! Please provide this summary of care documentation to your next provider. Your primary care clinician is listed as Bernadine Hernandez. If you have any questions after today's visit, please call [phone].

## 2018-08-09 NOTE — PROGRESS NOTES
Chief Complaint   Patient presents with    Follow-up     patient went to [facility] 8/6/2018 and was dx with urinary tract infection. given cephalexin 750 mg to take every 12 hours for 7 days.

## 2018-08-09 NOTE — PROGRESS NOTES
Written by Nataliia Guillaume, as dictated by Dr. Bernadine Hernandez MD.    Milagro Argueta is a 37 y.o. female. HPI  The patient presents today in a wheelchair with her  for a follow up after [facility]. Her  is providing most of her history. She experienced weakness. She denied dysuria. She was given IV abx and is currently taking a course of oral abx. She is currently using diapers. She is followed by neurology every 3-4 months. No changes have been made to her medication. Her  reports that she has 2 refills left of Tecfidera 240 mg. Her  reports that she generally has good energy, but that in the evenings her energy decreases. She was denied a smaller wheelchair because she has a power wheelchair. Patient Active Problem List   Diagnosis Code    Urinary incontinence R32    Fatigue R53.83    Constipation K59.00    Multiple sclerosis ([facility] [address] G35        Current Outpatient Prescriptions on File Prior to Visit   Medication Sig Dispense Refill    diclofenac (VOLTAREN) 1 % gel APPLY 2 GRAMS TOPICALLY TO AFFECTED AREA(S) 4 TIMES DAILY FOR 30 DAYS 100 g 0    baclofen (LIORESAL) 10 mg tablet Take 1 Tab by mouth two (2) times a day. 90 Tab 3    dimethyl fumarate (TECFIDERA) 240 mg cpDR Take 1 Tab by mouth two (2) times a day.  multivitamin (ONE A DAY) tablet Take 1 Tab by mouth daily. No current facility-administered medications on file prior to visit.         Allergies   Allergen Reactions    Penicillins Rash       Past Medical History:   Diagnosis Date    Constipation 6/24/2009    Fatigue 6/24/2009    MS (multiple sclerosis) ([facility] [address]     Urinary incontinence 6/24/2009       Past Surgical History:   Procedure Laterality Date    HX GYN      tubal ligation       Family History   Problem Relation Age of Onset    Liver Disease Mother        Social History     Social History    Marital status:      Spouse name: N/A    Number of children: N/A    Years of education: N/A     Occupational History    Not on file. Social History Main Topics    Smoking status: Never Smoker    Smokeless tobacco: Never Used    Alcohol use No    Drug use: No    Sexual activity: Yes     Partners: Male      Comment:      Other Topics Concern    Not on file     Social History Narrative       Review of Systems   Constitutional: Negative for malaise/fatigue. Respiratory: Negative for cough and shortness of breath. Genitourinary: Negative for dysuria, frequency, hematuria and urgency. Musculoskeletal: Negative for joint pain and myalgias. Neurological: Positive for weakness. Negative for dizziness, tingling, sensory change and headaches. Psychiatric/Behavioral: Negative for depression, memory loss and substance abuse. Visit Vitals    /72 (BP 1 Location: Right arm, BP Patient Position: Sitting)    Pulse 96    Temp 97.4 °F (36.3 °C) (Oral)    Resp 16    Ht 5' 7\" (1.702 m)    LMP 06/02/2018    SpO2 98%       Physical Exam   Constitutional: She is oriented to person, place, and time. She appears well-developed and well-nourished. HENT:   Right Ear: External ear normal.   Left Ear: External ear normal.   Eyes: Conjunctivae and EOM are normal. Left eye exhibits no discharge. Neck: Normal range of motion. Neck supple. Cardiovascular: Normal rate and regular rhythm. Pulmonary/Chest: Effort normal and breath sounds normal. She has no wheezes. Abdominal: Soft. Bowel sounds are normal. There is no tenderness. Neurological: She is alert and oriented to person, place, and time. Psychiatric: She has a normal mood and affect. Nursing note and vitals reviewed. ASSESSMENT and PLAN    ICD-10-CM ICD-9-CM    1. Acute cystitis without hematuria N30.00 595.0 methenamine hippurate (HIPREX) 1 gram tablet sent to pharmacy.     Hiprex 1 g prescribed for prophylaxis of recurrent bladder infections. I would like her to take once daily. If she does not have any other sxs, I wan her to take it for 1 month. She should drink 8-10 glasses of water per day. Told her to start once she is finished with Keflex. 2. Multiple sclerosis ([facility] [address] G35 340 She is followed by neurology every 3-4 months. No changes have been made to her medication. This plan was reviewed with the patient and patient agrees. All questions were answered. This scribe documentation was reviewed by me and accurately reflects the examination and decisions made by me. This note will not be viewable in [address]

## 2019-06-25 ENCOUNTER — OFFICE VISIT (OUTPATIENT)
Dept: PRIMARY CARE CLINIC | Age: 44
End: 2019-06-25

## 2019-06-25 VITALS
TEMPERATURE: 97.9 F | RESPIRATION RATE: 17 BRPM | BODY MASS INDEX: 29.82 KG/M2 | SYSTOLIC BLOOD PRESSURE: 104 MMHG | OXYGEN SATURATION: 100 % | DIASTOLIC BLOOD PRESSURE: 70 MMHG | HEIGHT: 67 IN | HEART RATE: 89 BPM | WEIGHT: 190 LBS

## 2019-06-25 DIAGNOSIS — N39.0 RECURRENT UTI: ICD-10-CM

## 2019-06-25 DIAGNOSIS — G35 MULTIPLE SCLEROSIS (HCC): ICD-10-CM

## 2019-06-25 DIAGNOSIS — R29.898 WEAKNESS OF BOTH LEGS: ICD-10-CM

## 2019-06-25 DIAGNOSIS — K59.09 OTHER CONSTIPATION: ICD-10-CM

## 2019-06-25 DIAGNOSIS — N39.490 OVERFLOW INCONTINENCE OF URINE: Primary | ICD-10-CM

## 2019-06-25 RX ORDER — OXYBUTYNIN CHLORIDE 5 MG/1
5 TABLET ORAL 2 TIMES DAILY
Qty: 60 TAB | Refills: 1 | Status: SHIPPED | OUTPATIENT
Start: 2019-06-25 | End: 2019-07-25

## 2019-06-25 NOTE — PROGRESS NOTES
Written by Franco Alvarado, as dictated by Dr. Prince Lo MD.    Ellis Quan is a 53 y.o. female. HPI  The patient presents today for a hospital follow-up. Patient is accompanied by her  and daughter who are helping to present her HX. She is using a wheelchair for mobility. She went to the [facility] ED in 05/2019 for a UTI. The pt was prescribed abx x 7 days and advised to follow-up with her PCP. Denies dysuria, but her  notes that she has been having increased urinary frequency. Patient was previously prescribed prophylactic abx but pt is reluctant to take it again. She has been having UTIs about once every 6 months. The pt has urinary incontinence due to MS. Followed by neurology at [facility] for MS. Last MRI was in 2017. She is taking baclofen and Tecfidera 240 mg. Patient will have her second PT appointment tomorrow. The pt feels her weakness has not worsened, but her  believes her weakness has worsened. Patient previously received a hospital bed, but it is now malfunctioning and they are not able to move it. She needs a new bed or someone to fix it. She is at  risk for aspiration,needs to be elevated above 30 degree. Also position of the body in ways not feasible with an ordinary bed in order to alleviate pain. Her  also requests increased home health for the pt. Patient is concerned about weight gain. Her  reports she weighs 190 lbs today and pt has gained weight from 185 lbs in 05/2018. The pt is not active. She does not eat meat. She is still dealing with constipation but is not taking any medication. Patient takes prunes when she has not had a bowel movement in about 5 days. The pt has not been sleeping continuously at night. She baclofen helps her to fall asleep, but she struggles to stay asleep. Compliant on OTC vitamin D.     Patient Active Problem List   Diagnosis Code    Urinary incontinence R32    Fatigue R53.83    Constipation K59.00    Multiple sclerosis ([facility] [address] G35        Current Outpatient Medications on File Prior to Visit   Medication Sig Dispense Refill    CHOLECALCIFEROL, VITAMIN D3, PO Take  by mouth.  baclofen (LIORESAL) 10 mg tablet Take 1 Tab by mouth two (2) times a day. 90 Tab 3    dimethyl fumarate (TECFIDERA) 240 mg cpDR Take 1 Tab by mouth two (2) times a day.  multivitamin (ONE A DAY) tablet Take 1 Tab by mouth daily. No current facility-administered medications on file prior to visit.         Allergies   Allergen Reactions    Penicillins Rash       Past Medical History:   Diagnosis Date    Constipation 6/24/2009    Fatigue 6/24/2009    MS (multiple sclerosis) ([facility]     Urinary incontinence 6/24/2009       Past Surgical History:   Procedure Laterality Date    HX GYN      tubal ligation       Family History   Problem Relation Age of Onset    Liver Disease Mother        Social History     Socioeconomic History    Marital status:      Spouse name: Not on file    Number of children: Not on file    Years of education: Not on file    Highest education level: Not on file   Occupational History    Not on file   Social Needs    Financial resource strain: Not on file    Food insecurity:     Worry: Not on file     Inability: Not on file    Transportation needs:     Medical: Not on file     Non-medical: Not on file   Tobacco Use    Smoking status: Never Smoker    Smokeless tobacco: Never Used   Substance and Sexual Activity    Alcohol use: No    Drug use: No    Sexual activity: Yes     Partners: Male     Comment:    Lifestyle    Physical activity:     Days per week: Not on file     Minutes per session: Not on file    Stress: Not on file   Relationships    Social connections:     Talks on phone: Not on file     Gets together: Not on file     Attends Jew service: Not on file     Active member of club or organization: Not on file     Attends meetings of clubs or organizations: Not on file     Relationship status: Not on file    Intimate partner violence:     Fear of current or ex partner: Not on file     Emotionally abused: Not on file     Physically abused: Not on file     Forced sexual activity: Not on file   Other Topics Concern    Not on file   Social History Narrative    Not on file       Review of Systems   Constitutional: Negative for malaise/fatigue. HENT: Negative for congestion. Eyes: Negative for blurred vision and pain. Respiratory: Negative for cough and shortness of breath. Cardiovascular: Negative for chest pain and palpitations. Gastrointestinal: Positive for constipation. Negative for abdominal pain and heartburn. Genitourinary: Positive for frequency. Negative for dysuria and urgency. +urinary incontinence   Musculoskeletal: Negative for joint pain and myalgias. Neurological: Positive for weakness. Negative for dizziness, tingling, sensory change and headaches. Psychiatric/Behavioral: Negative for depression, memory loss and substance abuse. The patient has insomnia. Visit Vitals  /70 (BP 1 Location: Right arm, BP Patient Position: Sitting)   Pulse 89   Temp 97.9 °F (36.6 °C) (Oral)   Resp 17   Ht 5' 7\" (1.702 m)   Wt 190 lb (86.2 kg) Comment: Patient''s spouse stated   LMP 06/20/2019   SpO2 100%   BMI 29.76 kg/m²       Physical Exam   Constitutional: She is oriented to person, place, and time. She appears well-developed and well-nourished. No distress. HENT:   Right Ear: External ear normal.   Left Ear: External ear normal.   Eyes: Conjunctivae and EOM are normal. Right eye exhibits no discharge. Left eye exhibits no discharge. Neck: Normal range of motion. Neck supple. Cardiovascular: Normal rate and regular rhythm. Pulmonary/Chest: Effort normal and breath sounds normal. She has no wheezes. Abdominal: Soft. Bowel sounds are normal. There is no tenderness.    Lymphadenopathy:     She has no cervical adenopathy. Neurological: She is alert and oriented to person, place, and time. Skin: She is not diaphoretic. Psychiatric: She has a normal mood and affect. Her behavior is normal.   Nursing note and vitals reviewed. ASSESSMENT and PLAN    ICD-10-CM ICD-9-CM    1. Overflow incontinence of urine N39.490 788.38 oxybutynin (DITROPAN) 5 mg tablet sent to pharmacy. REFERRAL TO HOME HEALTH      CANCELED: REFERRAL TO HOME HEALTH    Ditropan 5 mg prescribed. Potential side effects were discussed. She should take 1 tab PO in the evening. If it works well for her incontinence at night, she should also take 1 tab in the morning. 2. Multiple sclerosis ([facility] [address] G35 [address]      CANCELED: [facility]    Referred to home health and followed by neurology. Cancelled referral to [facility] as patient had an good experience to [facility] and wants to continue with them. Patient will get PT/OT from [facility]. 3. Other constipation K59.09 564.09 She should eat prunes, salad, and apples regularly. Advised OTC metamucil as needed. 4. Recurrent UTI N39.0 599.0 No prophylactic abx given at this time as she has UTIs about once every 6 months. 5. Weakness of both legs R29.898 729.89 REFERRAL TO HOME HEALTH          Referred to home health. Cancelled referral to [facility] as patient had an good experience to [facility] and wants to continue with them. Will get PT/Ot. This plan was reviewed with the patient and patient agrees. All questions were answered. This scribe documentation was reviewed by me and accurately reflects the examination and decisions made by me. This note will not be viewable in [address]

## 2019-07-17 ENCOUNTER — TELEPHONE (OUTPATIENT)
Dept: PRIMARY CARE CLINIC | Age: 44
End: 2019-07-17

## 2019-07-17 NOTE — TELEPHONE ENCOUNTER
Spoke to patient's  let him know we have not received the hospital bed paperwork. He will contact the  and get them to fax to us.

## 2019-07-18 ENCOUNTER — TELEPHONE (OUTPATIENT)
Dept: PRIMARY CARE CLINIC | Age: 44
End: 2019-07-18

## 2019-07-18 NOTE — TELEPHONE ENCOUNTER
Spoke to patient's  let him know we have not received paperwork. He will call  again to send paperwork.

## 2019-07-18 NOTE — TELEPHONE ENCOUNTER
Patient  calling to follow up with paperwork that was supposed to be faxed.     Please call him with an update     He was asking to speak with Alison Oleary

## 2019-07-22 NOTE — TELEPHONE ENCOUNTER
Confirmed speaking with patients . He states that the  was suppose to send paperwork to this office so that patient could get new hospital bed. States that  will not return his call. States that he was [facility] to [facility] through [facility] to get the bed but when he tried they are no longer accepting the insurance.       Patient last seen on 6/25/2019

## 2019-07-23 NOTE — TELEPHONE ENCOUNTER
Confirmed speaking with harsh ,  Advised that I had spoken with the nurse navigator and he needs to call patients insurance and find out who is in network and that he has talked to  and is not getting anywhere with  at fixing or getting new hospital bed for the patient. Existing is not functioning.   Received verbal understanding from

## 2019-07-23 NOTE — TELEPHONE ENCOUNTER
Patients  called again-    Still waiting on paperwork    Please contact him,he really would like this resolved

## 2019-07-24 NOTE — TELEPHONE ENCOUNTER
[facility] is out of the hospital beds, needs to be forwarded to [facility]. Fax [phone]

## 2019-07-24 NOTE — TELEPHONE ENCOUNTER
[facility] requires addendum to the office note of last visit that states that the positioning of the bed must elevate the head of the bed above 30 degrees angle to alleviate pain and to prevent aspiration.      Script needs to specify semi electric or full electric

## 2019-07-24 NOTE — TELEPHONE ENCOUNTER
Paperwork received, given to Dr Ericka Pang, I received written orders from Dr Ericka Pang, faxing now to [facility] equipment at [phone].  Will scan in once confirmation is received

## 2019-07-25 ENCOUNTER — TELEPHONE (OUTPATIENT)
Dept: PRIMARY CARE CLINIC | Age: 44
End: 2019-07-25

## 2019-07-25 NOTE — TELEPHONE ENCOUNTER
PT missed call and called back to follow up with request for home care delivery not the [facility] [phone] not sure if this is the same thing

## 2019-07-25 NOTE — TELEPHONE ENCOUNTER
LVM that all forms and changes have been faxed and they need to wait to [facility].

## 2019-07-25 NOTE — TELEPHONE ENCOUNTER
So this is not the same he is wanting adult incontinence underwear from [address] [place] [address]

## 2019-07-26 NOTE — TELEPHONE ENCOUNTER
Call About the paperwork - [facility] is waiting on the doctor. He states a fax has been sent with the information on it. PT's  - Nurys Rangel would like this tended to today so she will not be without she needs over the weekend.

## 2019-07-26 NOTE — TELEPHONE ENCOUNTER
This is for the bed, I need to get back from [facility] they sent it and states that three items are needed on the form npi number date of birth and length of need.    Will get with Willie Higgins on monday

## 2019-07-26 NOTE — TELEPHONE ENCOUNTER
I have not seen any paper work from [employer]. He said they had faxed it yesterday,. Please call them & find out.

## 2019-07-29 NOTE — TELEPHONE ENCOUNTER
Needed npi number on script needed length of need, needed  of patient on script. Done and refaxed to company. Confirmed receipt received.

## 2019-09-25 DIAGNOSIS — M62.838 MUSCLE SPASM: ICD-10-CM

## 2019-09-25 RX ORDER — BACLOFEN 10 MG/1
10 TABLET ORAL 2 TIMES DAILY
Qty: 90 TAB | Refills: 3 | Status: SHIPPED | OUTPATIENT
Start: 2019-09-25 | End: 2020-11-05 | Stop reason: SDUPTHER

## 2019-11-15 ENCOUNTER — OFFICE VISIT (OUTPATIENT)
Dept: PRIMARY CARE CLINIC | Age: 44
End: 2019-11-15

## 2019-11-15 VITALS
WEIGHT: 190 LBS | RESPIRATION RATE: 16 BRPM | HEART RATE: 75 BPM | OXYGEN SATURATION: 100 % | HEIGHT: 67 IN | SYSTOLIC BLOOD PRESSURE: 95 MMHG | TEMPERATURE: 98.2 F | BODY MASS INDEX: 29.82 KG/M2 | DIASTOLIC BLOOD PRESSURE: 66 MMHG

## 2019-11-15 DIAGNOSIS — K59.01 SLOW TRANSIT CONSTIPATION: ICD-10-CM

## 2019-11-15 DIAGNOSIS — R53.1 WEAKNESS GENERALIZED: Primary | ICD-10-CM

## 2019-11-15 DIAGNOSIS — G35 MULTIPLE SCLEROSIS (HCC): ICD-10-CM

## 2019-11-15 RX ORDER — AMOXICILLIN 250 MG
1 CAPSULE ORAL DAILY
Qty: 30 TAB | Refills: 0 | Status: SHIPPED | OUTPATIENT
Start: 2019-11-15 | End: 2019-12-15

## 2019-11-15 NOTE — LETTER
11/15/2019 3:11 PM 
 
Ms. Eda Szymanski [address] [facility] [address] To Whom It May Concern: 
 
Eda Szymanski is currently under the care of Jeferson Flores. She has a history of Multiple sclerosis and her weakness is progressing rapidly. She was recently seen by her neurologist, and some changes were made in her medication. She is a wheel chair bound, cannot do any ADLS, needs assistance 24/7. She is totally dependent for her care. If there are questions or concerns please contact our office. Sincerely, Marilu Simmonds, MD

## 2019-11-15 NOTE — PROGRESS NOTES
Room 5    Identified pt with two pt identifiers(name and ). Reviewed record in preparation for visit and have obtained necessary documentation. All patient medications has been reviewed. Chief Complaint   Patient presents with    Constipation     pt states, been going on more than 1 week.  Extremity Weakness       Health Maintenance Due   Topic    DTaP/Tdap/Td series (1 - Tdap)    Influenza Age 5 to Adult        Vitals:    11/15/19 1431   BP: 95/66   Pulse: 75   Resp: 16   Temp: 98.2 °F (36.8 °C)   TempSrc: Oral   SpO2: 100%   Weight: 190 lb (86.2 kg)   Height: 5' 7\" (1.702 m)   PainSc:   0 - No pain       Wt Readings from Last 3 Encounters:   11/15/19 190 lb (86.2 kg)   19 190 lb (86.2 kg)   18 185 lb (83.9 kg)     Temp Readings from Last 3 Encounters:   11/15/19 98.2 °F (36.8 °C) (Oral)   19 97.9 °F (36.6 °C) (Oral)   18 97.4 °F (36.3 °C) (Oral)     BP Readings from Last 3 Encounters:   11/15/19 95/66   19 104/70   18 108/72     Pulse Readings from Last 3 Encounters:   11/15/19 75   19 89   18 96       No results found for: HBA1C, HGBE8, XYU3SKLK, VVI4RLHF, RRJ7TFOP    Coordination of Care Questionnaire:   1) Have you been to an emergency room, urgent care, or hospitalized since your last visit?   no       2. Have seen or consulted any other health care provider since your last visit? NO    3) Do you have an Advanced Directive/ Living Will in place? NO  If yes, do we have a copy on file NO  If no, would you like information NO    Patient is accompanied by daughter and  I have received verbal consent from Cezar Chaves to discuss any/all medical information while they are present in the room.

## 2019-11-15 NOTE — LETTER
11/15/2019 3:18 PM 
 
Ms. Tesha Worthington [address] [facility] [address] To Whom It May Concern: 
 
Tesha Worthington is currently under the care of Jeferson Flores. She has a history of Multiple sclerosis and her weakness is progressing rapidly. She was recently seen by her neurologist, and some changes were made in her medication. She is a wheel chair bound, cannot do any ADLS, needs assistance 24/7. She is totally dependent for her care. She has prayer 5 times a day for [employer]ish purposes, but due to her weakness, she is unable to do so, and will need assistance. If there are questions or concerns please contact our office. Sincerely, Mendel Diana MD

## 2019-11-15 NOTE — PROGRESS NOTES
Written by Gildardo Ladd, as dictated by Dr. Sherrie Friend MD.    Lissy Rebolledo is a 53 y.o. female. HPI  Patient is seen today for evaluation for a wheel chair . Patient is accompanied by her  and daughter.  presents the patient history. The patient has severe mobility limitations which impair the ability to perform basic ADLs including dressing, feeding, grooming and toileting/bathing. Due to the limitations, the patient has difficulty accessing the bathroom, kitchen and bedroom without assistance. There has been worsening of the ambulation difficulty over time and recent changes include rapidly progressing weakness due to multiple sclerosis. The patient is not able to ambulate on 2 feet and cannot use a cane or walker due to progressive weakness due to multiple sclerosis. .     Ability to stand from a seated position? No.  . She will be moving to a new place and is in need of a new wheelchair with different dimensions as her former wheelchair is wearing down. She last saw the neurologist on 10/30/19, and had an MRI done 3 days ago. They have not heard the results of the MRI. Her multiple sclerosis medication was stopped, as they were told it was not helping her. Since stopping the medication her weakness has progressively gotten worse. She was prescribed Adderall 10 mg BID, which has helped her maintain alertness.  also reports having further limited time with home health aides, which has been difficult as the patient is fully dependent on others for her care. Denies recent illness and cough. She also reports constipation. She has been trying prunes and other home remedies to try to help with the constipation. Otherwise she has been eating normally. Denies dysuria. She received the flu shot on 10/30/19.       Patient Active Problem List   Diagnosis Code    Urinary incontinence R32    Fatigue R53.83    Constipation K59.00    Multiple sclerosis ([facility] G35        Current Outpatient Medications on File Prior to Visit   Medication Sig Dispense Refill    baclofen (LIORESAL) 10 mg tablet Take 1 Tab by mouth two (2) times a day. 90 Tab 3    CHOLECALCIFEROL, VITAMIN D3, PO Take  by mouth.  dimethyl fumarate (TECFIDERA) 240 mg cpDR Take 1 Tab by mouth two (2) times a day.  multivitamin (ONE A DAY) tablet Take 1 Tab by mouth daily. No current facility-administered medications on file prior to visit.         Allergies   Allergen Reactions    Penicillins Rash       Past Medical History:   Diagnosis Date    Constipation 6/24/2009    Fatigue 6/24/2009    MS (multiple sclerosis) ([facility]     Urinary incontinence 6/24/2009       Past Surgical History:   Procedure Laterality Date    HX GYN      tubal ligation       Family History   Problem Relation Age of Onset    Liver Disease Mother        Social History     Socioeconomic History    Marital status:      Spouse name: Not on file    Number of children: Not on file    Years of education: Not on file    Highest education level: Not on file   Occupational History    Not on file   Social Needs    Financial resource strain: Not on file    Food insecurity:     Worry: Not on file     Inability: Not on file    Transportation needs:     Medical: Not on file     Non-medical: Not on file   Tobacco Use    Smoking status: Never Smoker    Smokeless tobacco: Never Used   Substance and Sexual Activity    Alcohol use: No    Drug use: No    Sexual activity: Yes     Partners: Male     Comment:    Lifestyle    Physical activity:     Days per week: Not on file     Minutes per session: Not on file    Stress: Not on file   Relationships    Social connections:     Talks on phone: Not on file     Gets together: Not on file     Attends Gnosticism service: Not on file     Active member of club or organization: Not on file     Attends meetings of clubs or organizations: Not on file Relationship status: Not on file    Intimate partner violence:     Fear of current or ex partner: Not on file     Emotionally abused: Not on file     Physically abused: Not on file     Forced sexual activity: Not on file   Other Topics Concern    Not on file   Social History Narrative    Not on file       Review of Systems   Constitutional: Negative for malaise/fatigue and weight loss. Respiratory: Negative for cough and shortness of breath. Gastrointestinal: Positive for constipation. Negative for diarrhea and heartburn. Musculoskeletal: Negative for joint pain and myalgias. Neurological: Positive for weakness. Negative for dizziness and headaches. Psychiatric/Behavioral: Negative for depression and substance abuse. The patient is not nervous/anxious and does not have insomnia. Visit Vitals  BP 95/66 (BP 1 Location: Right arm, BP Patient Position: Sitting)   Pulse 75   Temp 98.2 °F (36.8 °C) (Oral)   Resp 16   Ht 5' 7\" (1.702 m)   Wt 190 lb (86.2 kg)   SpO2 100%   BMI 29.76 kg/m²       Physical Exam   Constitutional: She is oriented to person, place, and time. She appears well-developed and well-nourished. No distress. HENT:   Head: Normocephalic and atraumatic. Right Ear: External ear normal.   Left Ear: External ear normal.   Eyes: Pupils are equal, round, and reactive to light. Conjunctivae and EOM are normal. Right eye exhibits no discharge. Left eye exhibits no discharge. Neck: Normal range of motion. Neck supple. Cardiovascular: Normal rate, regular rhythm and normal heart sounds. Exam reveals no gallop and no friction rub. No murmur heard. Pulmonary/Chest: Effort normal and breath sounds normal. She has no wheezes. Abdominal: Soft. Bowel sounds are normal. There is no tenderness. Musculoskeletal: Normal range of motion. Neurological: She is alert and oriented to person, place, and time. She has normal reflexes. She exhibits abnormal muscle tone.  Coordination and gait abnormal.   Skin: She is not diaphoretic. Psychiatric: She has a normal mood and affect. Her behavior is normal. Thought content normal.   Nursing note and vitals reviewed. ASSESSMENT and PLAN    ICD-10-CM ICD-9-CM    1. Weakness generalized R53.1 780.79 Discussed weakness is due to Multiple Sclerosis. wheel chair and home health aide assistance is recommended. 2. Multiple sclerosis ([facility] G35 340 Followed by Neurology. 3. Slow transit constipation K59.01 564.01 senna-docusate (PERICOLACE) 8.6-50 mg per tablet sent to pharmacy. Percolace 8.6-50 mg prescribed. Potential side effects were discussed. Pt should take 1 tab daily. This plan was reviewed with the patient and patient agrees. All questions were answered. This scribe documentation was reviewed by me and accurately reflects the examination and decisions made by me. This note will not be viewable in [address]

## 2020-01-16 ENCOUNTER — OFFICE VISIT (OUTPATIENT)
Dept: PRIMARY CARE CLINIC | Age: 45
End: 2020-01-16

## 2020-01-16 VITALS
SYSTOLIC BLOOD PRESSURE: 102 MMHG | DIASTOLIC BLOOD PRESSURE: 69 MMHG | OXYGEN SATURATION: 100 % | HEART RATE: 99 BPM | RESPIRATION RATE: 14 BRPM | HEIGHT: 67 IN | BODY MASS INDEX: 29.76 KG/M2 | TEMPERATURE: 97.6 F

## 2020-01-16 DIAGNOSIS — L23.9 ALLERGIC DERMATITIS: ICD-10-CM

## 2020-01-16 DIAGNOSIS — N39.498 OTHER URINARY INCONTINENCE: ICD-10-CM

## 2020-01-16 DIAGNOSIS — L29.9 ITCHING: ICD-10-CM

## 2020-01-16 DIAGNOSIS — R29.898 WEAKNESS OF BOTH LEGS: ICD-10-CM

## 2020-01-16 DIAGNOSIS — G35 MULTIPLE SCLEROSIS (HCC): Primary | ICD-10-CM

## 2020-01-16 DIAGNOSIS — R21 RASH OF BACK: ICD-10-CM

## 2020-01-16 RX ORDER — BETAMETHASONE VALERATE 1.2 MG/G
OINTMENT TOPICAL 2 TIMES DAILY
Qty: 15 G | Refills: 0 | Status: SHIPPED | OUTPATIENT
Start: 2020-01-16 | End: 2021-06-29 | Stop reason: ALTCHOICE

## 2020-01-16 RX ORDER — PREDNISONE 20 MG/1
TABLET ORAL
Qty: 16 TAB | Refills: 0 | Status: SHIPPED | OUTPATIENT
Start: 2020-01-16 | End: 2021-06-29 | Stop reason: ALTCHOICE

## 2020-01-16 RX ORDER — OXYBUTYNIN CHLORIDE 5 MG/1
5 TABLET ORAL 3 TIMES DAILY
Qty: 90 TAB | Refills: 0 | Status: SHIPPED | OUTPATIENT
Start: 2020-01-16 | End: 2020-02-15

## 2020-01-16 NOTE — PROGRESS NOTES
Chief Complaint   Patient presents with    Multiple Sclerosis    Rash     x 5 days  itchy  arms hands back buttocks    Urinary Frequency     possible UTI     1. Have you been to the ER, urgent care clinic since your last visit? Hospitalized since your last visit? No    2. Have you seen or consulted any other health care providers outside of the [address] [place] [address] since your last visit? Include any pap smears or colon screening.  No     Health Maintenance Due   Topic Date Due    DTaP/Tdap/Td series (1 - Tdap) 06/18/1984

## 2020-01-16 NOTE — PROGRESS NOTES
Written by Kelly Moscoso, as dictated by Dr. Vanna Klein MD.    Hoang Ann is a 53 y.o. female. HPI  Patient is seen today for evaluation for power mobility equipment. Patient is accompanied by her  and daughter.  presents patient's history. The patient has severe mobility limitations which impair the ability to perform basic ADLs including dressing, feeding, grooming and toileting/bathing. Due to the limitations, the patient has difficulty accessing the bathroom, kitchen and bedroom without assistance. There has been worsening of the ambulation difficulty over time and recent changes include history of multiple sclerosis. Patient  cannot use a cane or walker due to weakness in BL legs due to progressively worsening multiple sclerosis. She would be bed confined without the use of a lift. She requires assistance of more than one person. The patient currently uses manual wheelchair (what devices) which she can`t push anymore due to the weakness in her upper extremities   not able to self-propel and use a manual wheelchair of any type due to weak upper body strength, no  strength, and lack of endurance. due to progressively worsening multiple sclerosis. Ability to stand from a seated position? no        Patient has the mental/physical capability to operate a power wheelchair and will be utilizing it to assist in completing their mobility related activities of daily living in their home. She c/o itchy rash, on her back, buttock, arms and hands. Denies new lotions, clothes, detergent and foods. She has tried Benadryl without improvement. She recently had an MRI done at [facility]. She has an appointment for follow-up scheduled in 02/2020 with [facility]rology. Her urinary incontinence has been worsening due to her worsening MS.        Patient Active Problem List   Diagnosis Code    Urinary incontinence R32    Fatigue R53.83    Constipation K59.00    Multiple sclerosis ([facility] G35        Current Outpatient Medications on File Prior to Visit   Medication Sig Dispense Refill    baclofen (LIORESAL) 10 mg tablet Take 1 Tab by mouth two (2) times a day. 90 Tab 3    CHOLECALCIFEROL, VITAMIN D3, PO Take  by mouth.  dimethyl fumarate (TECFIDERA) 240 mg cpDR Take 1 Tab by mouth two (2) times a day.  multivitamin (ONE A DAY) tablet Take 1 Tab by mouth daily. No current facility-administered medications on file prior to visit.         Allergies   Allergen Reactions    Penicillins Rash       Past Medical History:   Diagnosis Date    Constipation 6/24/2009    Fatigue 6/24/2009    MS (multiple sclerosis) ([facility]     Urinary incontinence 6/24/2009       Past Surgical History:   Procedure Laterality Date    HX GYN      tubal ligation       Family History   Problem Relation Age of Onset    Liver Disease Mother        Social History     Socioeconomic History    Marital status:      Spouse name: Not on file    Number of children: Not on file    Years of education: Not on file    Highest education level: Not on file   Occupational History    Not on file   Social Needs    Financial resource strain: Not on file    Food insecurity:     Worry: Not on file     Inability: Not on file    Transportation needs:     Medical: Not on file     Non-medical: Not on file   Tobacco Use    Smoking status: Never Smoker    Smokeless tobacco: Never Used   Substance and Sexual Activity    Alcohol use: No    Drug use: No    Sexual activity: Yes     Partners: Male     Comment:    Lifestyle    Physical activity:     Days per week: Not on file     Minutes per session: Not on file    Stress: Not on file   Relationships    Social connections:     Talks on phone: Not on file     Gets together: Not on file     Attends Buddhist service: Not on file     Active member of club or organization: Not on file     Attends meetings of clubs or organizations: Not on file     Relationship status: Not on file    Intimate partner violence:     Fear of current or ex partner: Not on file     Emotionally abused: Not on file     Physically abused: Not on file     Forced sexual activity: Not on file   Other Topics Concern    Not on file   Social History Narrative    Not on file       Review of Systems   Constitutional: Negative for malaise/fatigue and weight loss. HENT: Negative for congestion and hearing loss. Eyes: Negative for blurred vision and photophobia. Respiratory: Negative for cough and shortness of breath. Cardiovascular: Negative for chest pain and leg swelling. Gastrointestinal: Negative for constipation, diarrhea and heartburn. Genitourinary: Positive for frequency and urgency. + urinary incontinence   Musculoskeletal: Negative for joint pain and myalgias. Skin: Positive for itching and rash. Neurological: Positive for weakness. Negative for dizziness and headaches. Psychiatric/Behavioral: Negative for depression and substance abuse. The patient is not nervous/anxious and does not have insomnia. Visit Vitals  /69   Pulse 99   Temp 97.6 °F (36.4 °C) (Oral)   Resp 14   Ht 5' 7\" (1.702 m)   LMP 11/13/2019 (Approximate)   SpO2 100%   BMI 29.76 kg/m²       Physical Exam  Vitals signs and nursing note reviewed. Constitutional:       General: She is not in acute distress. Appearance: Normal appearance. She is well-developed, well-groomed and overweight. She is not diaphoretic. HENT:      Head: Normocephalic and atraumatic. Right Ear: External ear normal.      Left Ear: External ear normal.   Eyes:      General:         Right eye: No discharge. Left eye: No discharge. Conjunctiva/sclera: Conjunctivae normal.      Pupils: Pupils are equal, round, and reactive to light. Neck:      Musculoskeletal: Normal range of motion and neck supple. Cardiovascular:      Rate and Rhythm: Normal rate and regular rhythm. Heart sounds: Normal heart sounds. No murmur. No friction rub. No gallop. Pulmonary:      Effort: Pulmonary effort is normal.      Breath sounds: Normal breath sounds. No wheezing. Abdominal:      General: Bowel sounds are normal.      Palpations: Abdomen is soft. Tenderness: There is no tenderness. Musculoskeletal: Normal range of motion. Skin:     Findings: Rash present. Comments: Erythematous rash in lower back , upper arms & left forearm. Neurological:      Mental Status: She is alert and oriented to person, place, and time. Sensory: Sensory deficit present. Motor: Weakness present. Gait: Gait abnormal.      Deep Tendon Reflexes: Reflexes are normal and symmetric. Psychiatric:         Behavior: Behavior normal.         Thought Content: Thought content normal.         ASSESSMENT and PLAN    ICD-10-CM ICD-9-CM    1. Multiple sclerosis ([facility] G35 340 Followed by Neurology. MRI done recently. Will discuss the result with Neurology on her next appointment. 2. Weakness of both legs R29.898 729.89 Patient would benefit from power wheel chair, bath chair & lift. 3. Allergic dermatitis L23.9 692.9 predniSONE (DELTASONE) 20 mg tablet sent to pharmacy. Prednisone 20 mg prescribed. Potential side effects were discussed. I want the patient to take the medication po as follows: 3 pills x 2 days, 2 pills x 2 days, 1 pill x 3 days and 1/2 pill x 2 days. The patient was advised to take this medication with food. 4. Itching L29.9 698.9 predniSONE (DELTASONE) 20 mg tablet sent to pharmacy. Prednisone 20 mg prescribed. Potential side effects were discussed. I want the patient to take the medication po as follows: 3 pills x 2 days, 2 pills x 2 days, 1 pill x 3 days and 1/2 pill x 2 days. The patient was advised to take this medication with food. Advised patient to take Zyrtec daily. Discussed patient does not need to take Benadryl while on Zyrtec.    5. Other urinary incontinence N39.498 788.39 oxybutynin (DITROPAN) 5 mg tablet sent to pharmacy. Oxybutynin 5 mg prescribed. Potential side effects were discussed. Pt should take 1 tab TID. 6. Rash of back R21 782.1 betamethasone valerate (VALISONE) 0.1 % ointment sent to pharmacy. Valisone 0.1 % ointment prescribed. Potential side effects were discussed. Pt should apply to affected area BID. This plan was reviewed with the patient and patient agrees. All questions were answered. This scribe documentation was reviewed by me and accurately reflects the examination and decisions made by me. This note will not be viewable in [address]

## 2020-01-23 ENCOUNTER — TELEPHONE (OUTPATIENT)
Dept: PRIMARY CARE CLINIC | Age: 45
End: 2020-01-23

## 2020-01-24 NOTE — TELEPHONE ENCOUNTER
Faxed successfully per fax confirmation receipt. Called and spoke with  and [facility] with update. End of Encounter.

## 2020-01-27 ENCOUNTER — TELEPHONE (OUTPATIENT)
Dept: PRIMARY CARE CLINIC | Age: 45
End: 2020-01-27

## 2020-02-03 ENCOUNTER — TELEPHONE (OUTPATIENT)
Dept: PRIMARY CARE CLINIC | Age: 45
End: 2020-02-03

## 2020-02-03 DIAGNOSIS — B37.9 CANDIDIASIS: Primary | ICD-10-CM

## 2020-02-03 RX ORDER — FLUCONAZOLE 150 MG/1
150 TABLET ORAL DAILY
Qty: 3 TAB | Refills: 0 | Status: SHIPPED | OUTPATIENT
Start: 2020-02-03 | End: 2020-02-06

## 2020-02-03 NOTE — TELEPHONE ENCOUNTER
Called and spoke with patient's  and [facility] and he advises patient treated last week for UTI and now has a yeast infection, would like Diflucan called to pharmacy. Alex eMndoza. End of encounter.

## 2020-02-03 NOTE — TELEPHONE ENCOUNTER
Patient went to the er for a uti, was given a medication and now has a rash, possible yeast infection.  Would like something called in

## 2020-02-04 NOTE — TELEPHONE ENCOUNTER
Called and spoke with patients  and [employer] and advised medication sent to the pharmacy yesterday evening. End of encounter.

## 2020-03-06 ENCOUNTER — TELEPHONE (OUTPATIENT)
Dept: PRIMARY CARE CLINIC | Age: 45
End: 2020-03-06

## 2020-03-12 ENCOUNTER — TELEPHONE (OUTPATIENT)
Dept: PRIMARY CARE CLINIC | Age: 45
End: 2020-03-12

## 2020-11-05 DIAGNOSIS — M62.838 MUSCLE SPASM: ICD-10-CM

## 2020-11-05 RX ORDER — BACLOFEN 10 MG/1
10 TABLET ORAL 2 TIMES DAILY
Qty: 90 TAB | Refills: 3 | Status: SHIPPED | OUTPATIENT
Start: 2020-11-05 | End: 2021-03-02 | Stop reason: SDUPTHER

## 2020-11-09 ENCOUNTER — TELEPHONE (OUTPATIENT)
Dept: PRIMARY CARE CLINIC | Age: 45
End: 2020-11-09

## 2020-11-09 NOTE — TELEPHONE ENCOUNTER
----- Message from Mike Dailey sent at 11/7/2020 11:30 AM EST -----  Regarding: Dr. Serenity Cobos (if not patient):      Relationship of caller (if not patient): Patient      Best contact number(s):[phone]       Name of medication and dosage if known: baclofen (LIORESAL) 10 mg tablet      Is patient out of this medication (yes/no): Yes      Pharmacy name: [address] listed in chart? (yes/no):yes  Pharmacy phone number:      Details to clarify the request:      Mike Dailey

## 2021-03-02 DIAGNOSIS — M62.838 MUSCLE SPASM: ICD-10-CM

## 2021-03-02 RX ORDER — BACLOFEN 10 MG/1
10 TABLET ORAL 2 TIMES DAILY
Qty: 90 TAB | Refills: 3 | Status: SHIPPED | OUTPATIENT
Start: 2021-03-02 | End: 2021-06-14 | Stop reason: SDUPTHER

## 2021-06-11 DIAGNOSIS — M62.838 MUSCLE SPASM: ICD-10-CM

## 2021-06-11 NOTE — TELEPHONE ENCOUNTER
----- Message from Tiffanykellen Weinstein sent at 6/11/2021  1:22 PM EDT -----  Regarding: MD Tamara/Refill  Medication Refill    Caller (if not patient): Adeel      Relationship of caller (if not patient):       Best contact number(s): [phone]      Name of medication and dosage if known: Baclofen       Is patient out of this medication (yes/no): Yes      Pharmacy name[facility] listed in chart? (yes/no): Yes  Pharmacy phone number: [phone]       Details to clarify the request: Pt requesting another refill on Rx. States the last time it was refilled pt was only given 15 tablets. She is now out.       Tiffany Weinstein

## 2021-06-14 ENCOUNTER — TELEPHONE (OUTPATIENT)
Dept: PRIMARY CARE CLINIC | Age: 46
End: 2021-06-14

## 2021-06-14 RX ORDER — BACLOFEN 10 MG/1
10 TABLET ORAL 2 TIMES DAILY
Qty: 90 TABLET | Refills: 3 | Status: SHIPPED | OUTPATIENT
Start: 2021-06-14 | End: 2021-06-29 | Stop reason: ALTCHOICE

## 2021-06-14 NOTE — TELEPHONE ENCOUNTER
Requested Prescriptions     Pending Prescriptions Disp Refills    baclofen (LIORESAL) 10 mg tablet 90 Tablet 3     Sig: Take 1 Tablet by mouth two (2) times a day.         Last Visit 1/16/20  Last Refill 3/2/21

## 2021-06-14 NOTE — TELEPHONE ENCOUNTER
Melia Bey called regarding patient, and is requesting a refill for baclofen and stated he is waiting at pharmacy now.

## 2021-06-14 NOTE — TELEPHONE ENCOUNTER
Patients  called and checking on refill, refill has not yet been sent/approved as of yet. Dr Kurt Reyes is out of office today and tomorrow and I told patient I would forward her the message.  Upcoming appointment 6/29/21

## 2021-06-29 ENCOUNTER — OFFICE VISIT (OUTPATIENT)
Dept: PRIMARY CARE CLINIC | Age: 46
End: 2021-06-29
Payer: COMMERCIAL

## 2021-06-29 VITALS
OXYGEN SATURATION: 98 % | RESPIRATION RATE: 14 BRPM | HEART RATE: 76 BPM | BODY MASS INDEX: 29.76 KG/M2 | TEMPERATURE: 97.1 F | DIASTOLIC BLOOD PRESSURE: 76 MMHG | HEIGHT: 67 IN | SYSTOLIC BLOOD PRESSURE: 105 MMHG

## 2021-06-29 DIAGNOSIS — M79.89 LEG SWELLING: ICD-10-CM

## 2021-06-29 DIAGNOSIS — M62.838 MUSCLE SPASTICITY: ICD-10-CM

## 2021-06-29 DIAGNOSIS — Z11.59 NEED FOR HEPATITIS C SCREENING TEST: ICD-10-CM

## 2021-06-29 DIAGNOSIS — N39.45 CONTINUOUS LEAKAGE OF URINE: ICD-10-CM

## 2021-06-29 DIAGNOSIS — K59.01 SLOW TRANSIT CONSTIPATION: ICD-10-CM

## 2021-06-29 DIAGNOSIS — E55.9 VITAMIN D DEFICIENCY: ICD-10-CM

## 2021-06-29 DIAGNOSIS — G35 MULTIPLE SCLEROSIS (HCC): Primary | ICD-10-CM

## 2021-06-29 DIAGNOSIS — R26.89 BALANCE PROBLEM: ICD-10-CM

## 2021-06-29 PROCEDURE — 99214 OFFICE O/P EST MOD 30 MIN: CPT | Performed by: INTERNAL MEDICINE

## 2021-06-29 RX ORDER — BACLOFEN 10 MG/1
10 TABLET ORAL 3 TIMES DAILY
Qty: 90 TABLET | Refills: 2 | Status: SHIPPED | OUTPATIENT
Start: 2021-06-29 | End: 2021-10-20 | Stop reason: SDUPTHER

## 2021-06-29 RX ORDER — FUROSEMIDE 40 MG/1
TABLET ORAL
Qty: 30 TABLET | Refills: 0 | Status: SHIPPED | OUTPATIENT
Start: 2021-06-29

## 2021-06-29 NOTE — PROGRESS NOTES
Sumi Robins (: 1975) is a 48 y.o. female, established patient, here for evaluation of the following chief complaint(s):  Physical (legs are swollen, frequently urinating, weak and very lethargic)    Written by Heather Moore, as dictated by Dr. Jesus Kim MD.      ASSESSMENT/PLAN:  Below is the assessment and plan developed based on review of pertinent history, physical exam, labs, studies, and medications. 1. Multiple sclerosis ([address]  Referred to new  Neurology. Refilled a prescription of baclofen 10 mg. Take medication as prescribed. Referred to home health since patient has limited mobility.  -     REFERRAL TO NEUROLOGY  -     baclofen (LIORESAL) 10 mg tablet; Take 1 Tablet by mouth three (3) times daily for 90 days. , Normal, Disp-90 Tablet, R-2 sent to [address] [facility]    2. Balance problem  Referred to home health since patient has limited mobility and increase risk of fall.   -     [facility]    3. Continuous leakage of urine  Continue wearing a diaper. Referred to home health since she cannot go to the bathroom herself. 4. Leg swelling  Ordered lab work to check metabolic panel, CBC levels, and lipid panel. Prescribed Lasix 40 mg. Take medication as prescribed. Potential side effects were discussed. Recommend that she eat a banana on the same day she takes the medication to maintain potassium levels. -     METABOLIC PANEL, COMPREHENSIVE; Future  -     CBC W/O DIFF; Future  -     LIPID PANEL; Future  -     furosemide (LASIX) 40 mg tablet; Twice a week, Normal, Disp-30 Tablet, R-0 sent to pharmacy. 5. Slow transit constipation  Recommend increasing water intake and eating more apples with skin. Needs to take prunes and eating oatmeal for breakfast.    6. Vitamin D deficiency  Ordered lab work to check Vitamin D levels. Waiting for results. Recommend that patient take a Vitamin D supplement of 1,000 units daily.    -     VITAMIN D, 25 HYDROXY; Future    7. Need for hepatitis C screening test  Ordered hepatitis C screening test. Waiting for results.  -     HEPATITIS C AB; Future    8. Muscle spasticity  Refilled prescription of baclofen 10 mg. Take medication as prescribed. Referred to home health since patient has limited mobility. -     baclofen (LIORESAL) 10 mg tablet; Take 1 Tablet by mouth three (3) times daily for 90 days. , Normal, Disp-90 Tablet, R-2 sent to [address] [place] [address] Drive:  HPI  Patient presents today for a physical exam. She is accompanied by her  and caregiver , who help dictate HPI. She has a dx of multiple sclerosis, and is followed by Dr. Gold Duncan at [facility] . She has mobility limitations due to her dx, and her daughter is her caretaker. She takes Tecfidera and baclofen for multiple sclerosis. They moved to [facility] and open to see a new Neurologist closer to her location. She has swelling in BL lower extremities. Her sxs are exacerbated by sitting in her wheelchair for long periods of time. Her swelling has  improved when her legs are elevated. Leg weakness is getting worse. Can`t stand or taking few steps is becoming very challenging. She has urinary incontinence, and wears a diaper. She has sxs of constipation and bloating, and drinks prune juice which works occassionally. She does not take a Vitamin D supplement daily. Patient Active Problem List   Diagnosis Code    Urinary incontinence R32    Fatigue R53.83    Constipation K59.00    Multiple sclerosis ([facility] G35        Current Outpatient Medications on File Prior to Visit   Medication Sig Dispense Refill    CHOLECALCIFEROL, VITAMIN D3, PO Take  by mouth.  [DISCONTINUED] baclofen (LIORESAL) 10 mg tablet Take 1 Tablet by mouth two (2) times a day. 90 Tablet 3    [DISCONTINUED] predniSONE (DELTASONE) 20 mg tablet Prednisone 60 mg po x 2 day,40 mg po x 2 days,20 mg po x 3 day,10 mg po x 2 day then stop. (Patient not taking: Reported on 6/29/2021) 16 Tab 0    [DISCONTINUED] betamethasone valerate (VALISONE) 0.1 % ointment Apply  to affected area two (2) times a day. (Patient not taking: Reported on 6/29/2021) 15 g 0    dimethyl fumarate (TECFIDERA) 240 mg cpDR Take 1 Tab by mouth two (2) times a day. (Patient not taking: Reported on 6/29/2021)      multivitamin (ONE A DAY) tablet Take 1 Tab by mouth daily. (Patient not taking: Reported on 6/29/2021)       No current facility-administered medications on file prior to visit. Allergies   Allergen Reactions    Penicillins Rash       Past Medical History:   Diagnosis Date    Constipation 6/24/2009    Fatigue 6/24/2009    MS (multiple sclerosis) ([facility]     Urinary incontinence 6/24/2009       Past Surgical History:   Procedure Laterality Date    HX GYN      tubal ligation       Family History   Problem Relation Age of Onset    Liver Disease Mother        Social History     Socioeconomic History    Marital status:      Spouse name: Not on file    Number of children: Not on file    Years of education: Not on file    Highest education level: Not on file   Occupational History    Not on file   Tobacco Use    Smoking status: Never Smoker    Smokeless tobacco: Never Used   Vaping Use    Vaping Use: Never used   Substance and Sexual Activity    Alcohol use: No    Drug use: No    Sexual activity: Yes     Partners: Male     Comment:    Other Topics Concern    Not on file   Social History Narrative    Not on file     Social Determinants of Health     Financial Resource Strain:     Difficulty of Paying Living Expenses:    Food Insecurity:     Worried About Running Out of Food in the Last Year:     Ran Out of Food in the Last Year:    Transportation Needs:     Lack of Transportation (Medical):      Lack of Transportation (Non-Medical):    Physical Activity:     Days of Exercise per Week:     Minutes of Exercise per Session:    Stress:  Feeling of Stress :    Social Connections:     Frequency of Communication with Friends and Family:     Frequency of Social Gatherings with Friends and Family:     Attends Denominational Services:     Active Member of Clubs or Organizations:     Attends Club or Organization Meetings:     Marital Status:    Intimate Partner Violence:     Fear of Current or Ex-Partner:     Emotionally Abused:     Physically Abused:     Sexually Abused:        No visits with results within 3 Month(s) from this visit. Latest known visit with results is:   Office Visit on 03/06/2018   Component Date Value Ref Range Status    Color (UA POC) 03/06/2018 Yellow   Final    Clarity (UA POC) 03/06/2018 Slightly Cloudy   Final    Glucose (UA POC) 03/06/2018 Negative  Negative Final    Bilirubin (UA POC) 03/06/2018 Negative  Negative Final    Ketones (UA POC) 03/06/2018 Negative  Negative Final    Specific gravity (UA POC) 03/06/2018 1.025  1.001 - 1.035 Final    Blood (UA POC) 03/06/2018 Trace  Negative Final    pH (UA POC) 03/06/2018 6.0  4.6 - 8.0 Final    Protein (UA POC) 03/06/2018 Trace  Negative Final    Urobilinogen (UA POC) 03/06/2018 0.2 mg/dL  0.2 - 1 Final    Nitrites (UA POC) 03/06/2018 Positive  Negative Final    Leukocyte esterase (UA POC) 03/06/2018 3+  Negative Final    Urine Culture, Routine 03/06/2018 *  Final                    Value:Escherichia coli  Identified by an automated biochemical system. Greater than 100,000 colony forming units per mL    Urine Culture, Routine 03/06/2018    Final                    Value:Mixed urogenital akira  50,000-100,000 colony forming units per mL        Review of Systems   Constitutional: Negative for activity change, fatigue and unexpected weight change. HENT: Negative for congestion, hearing loss, rhinorrhea and sore throat. Eyes: Negative for discharge. Respiratory: Negative for cough, chest tightness and shortness of breath.     Cardiovascular: Positive for leg swelling. Gastrointestinal: Negative for abdominal pain, constipation and diarrhea. Genitourinary: Negative for dysuria, flank pain, frequency and urgency. Musculoskeletal: Positive for myalgias (Multiple sclerosis). Negative for arthralgias and back pain. Skin: Negative for color change and rash. Neurological: Negative for dizziness, light-headedness and headaches. Psychiatric/Behavioral: Negative for dysphoric mood and sleep disturbance. The patient is not nervous/anxious. Visit Vitals  /76 (BP 1 Location: Right arm)   Pulse 76   Temp 97.1 °F (36.2 °C)   Resp 14   Ht 5' 7\" (1.702 m)   SpO2 98%   BMI 29.76 kg/m²      Physical Exam  Vitals and nursing note reviewed. Constitutional:       General: She is not in acute distress. Appearance: Normal appearance. She is normal weight. She is not diaphoretic. HENT:      Right Ear: Tympanic membrane, ear canal and external ear normal.      Left Ear: Tympanic membrane, ear canal and external ear normal.      Mouth/Throat:      Mouth: Mucous membranes are moist.      Pharynx: Oropharynx is clear. Eyes:      General:         Right eye: No discharge. Left eye: No discharge. Extraocular Movements: Extraocular movements intact. Conjunctiva/sclera: Conjunctivae normal.   Neck:      Thyroid: No thyromegaly. Cardiovascular:      Rate and Rhythm: Normal rate and regular rhythm. Pulses: Normal pulses. Dorsalis pedis pulses are 2+ on the right side and 2+ on the left side. Pulmonary:      Effort: Pulmonary effort is normal.      Breath sounds: Normal breath sounds. No wheezing. Abdominal:      General: Bowel sounds are normal. There is no distension. Palpations: Abdomen is soft. Tenderness: There is no abdominal tenderness. Musculoskeletal:      Right lower leg: No edema. Left lower leg: No edema.       Comments: B/L knees without crepitus   Lymphadenopathy:      Cervical: No cervical adenopathy. Neurological:      Deep Tendon Reflexes:      Reflex Scores:       Patellar reflexes are 2+ on the right side and 2+ on the left side. Psychiatric:         Mood and Affect: Mood and affect normal.             An electronic signature was used to authenticate this note.   -- Pauline Gowers

## 2021-06-29 NOTE — PROGRESS NOTES
Chief Complaint   Patient presents with    Physical     legs are swollen, frequently urinating, weak and very lethargic       Visit Vitals  /76 (BP 1 Location: Right arm)   Pulse 76   Temp 97.1 °F (36.2 °C)   Resp 14   Ht 5' 7\" (1.702 m)   SpO2 98%   BMI 29.76 kg/m²       1. Have you been to the ER, urgent care clinic since your last visit? Hospitalized since your last visit? No    2. Have you seen or consulted any other health care providers outside of the [address] [place] [address] since your last visit? Include any pap smears or colon screening.  No

## 2021-06-30 LAB
25(OH)D3 SERPL-MCNC: 26.6 NG/ML (ref 30–100)
ALBUMIN SERPL-MCNC: 3.5 G/DL (ref 3.5–5)
ALBUMIN/GLOB SERPL: 0.9 {RATIO} (ref 1.1–2.2)
ALP SERPL-CCNC: 120 U/L (ref 45–117)
ALT SERPL-CCNC: 20 U/L (ref 12–78)
ANION GAP SERPL CALC-SCNC: 4 MMOL/L (ref 5–15)
AST SERPL-CCNC: 10 U/L (ref 15–37)
BILIRUB SERPL-MCNC: 0.3 MG/DL (ref 0.2–1)
BUN SERPL-MCNC: 11 MG/DL (ref 6–20)
BUN/CREAT SERPL: 31 (ref 12–20)
CALCIUM SERPL-MCNC: 9.5 MG/DL (ref 8.5–10.1)
CHLORIDE SERPL-SCNC: 105 MMOL/L (ref 97–108)
CHOLEST SERPL-MCNC: 273 MG/DL
CO2 SERPL-SCNC: 30 MMOL/L (ref 21–32)
CREAT SERPL-MCNC: 0.35 MG/DL (ref 0.55–1.02)
ERYTHROCYTE [DISTWIDTH] IN BLOOD BY AUTOMATED COUNT: 15.9 % (ref 11.5–14.5)
GLOBULIN SER CALC-MCNC: 4 G/DL (ref 2–4)
GLUCOSE SERPL-MCNC: 71 MG/DL (ref 65–100)
HCT VFR BLD AUTO: 38.4 % (ref 35–47)
HCV AB SERPL QL IA: NONREACTIVE
HCV COMMENT,HCGAC: NORMAL
HDLC SERPL-MCNC: 71 MG/DL
HDLC SERPL: 3.8 {RATIO} (ref 0–5)
HGB BLD-MCNC: 11.7 G/DL (ref 11.5–16)
LDLC SERPL CALC-MCNC: 180 MG/DL (ref 0–100)
MCH RBC QN AUTO: 25.4 PG (ref 26–34)
MCHC RBC AUTO-ENTMCNC: 30.5 G/DL (ref 30–36.5)
MCV RBC AUTO: 83.5 FL (ref 80–99)
NRBC # BLD: 0 K/UL (ref 0–0.01)
NRBC BLD-RTO: 0 PER 100 WBC
PLATELET # BLD AUTO: 390 K/UL (ref 150–400)
PMV BLD AUTO: 11.3 FL (ref 8.9–12.9)
POTASSIUM SERPL-SCNC: 4.6 MMOL/L (ref 3.5–5.1)
PROT SERPL-MCNC: 7.5 G/DL (ref 6.4–8.2)
RBC # BLD AUTO: 4.6 M/UL (ref 3.8–5.2)
SODIUM SERPL-SCNC: 139 MMOL/L (ref 136–145)
TRIGL SERPL-MCNC: 110 MG/DL (ref ?–150)
VLDLC SERPL CALC-MCNC: 22 MG/DL
WBC # BLD AUTO: 7.8 K/UL (ref 3.6–11)

## 2021-07-01 NOTE — PROGRESS NOTES
Please call her  and find out if they [address] had contacted them? Her cholesterol came back high. I  would recommend low carb diet ( cutting down on Pasta, Rice and white bread) avoiding fried food.  Needs Vitamin D3 OTC 1000 I.U daily dose

## 2021-07-02 NOTE — PROGRESS NOTES
Spoke with patient's  informed of test results and recommendations. He stated patient is currently taking 2000iu of Vit D daily. They have not heard from [address] yet.

## 2021-07-07 ENCOUNTER — TELEPHONE (OUTPATIENT)
Dept: PRIMARY CARE CLINIC | Age: 46
End: 2021-07-07

## 2021-07-07 ENCOUNTER — HOME HEALTH ADMISSION (OUTPATIENT)
Dept: HOME HEALTH SERVICES | Facility: HOME HEALTH | Age: 46
End: 2021-07-07
Payer: COMMERCIAL

## 2021-07-07 NOTE — TELEPHONE ENCOUNTER
Returned call to patient(spouse) he stated they have not heard from home health company. Advised him that I will give [facility] health a call to find out the status. Spoke with intake at [facility], she stated she will work on it.

## 2021-07-07 NOTE — TELEPHONE ENCOUNTER
----- Message from Melly Pascual sent at 7/7/2021  1:28 PM EDT -----  Regarding: Dr. Durán Meo: [phone]  General Message/Vendor Calls    Caller's first and last name: [facility], . Reason for call: Last Tuesday had appointment, was set up for home physical therapy, was told to call back if pt had not heard anything. Callback required yes/no and why: Yes, call from pcp or nurse. Best contact number(s): [phone]      Details to clarify the request: N/a.       Melly Pascual

## 2021-07-12 ENCOUNTER — HOME CARE VISIT (OUTPATIENT)
Dept: SCHEDULING | Facility: HOME HEALTH | Age: 46
End: 2021-07-12
Payer: COMMERCIAL

## 2021-07-12 PROCEDURE — 400013 HH SOC

## 2021-07-12 PROCEDURE — G0151 HHCP-SERV OF PT,EA 15 MIN: HCPCS

## 2021-07-13 VITALS
OXYGEN SATURATION: 99 % | TEMPERATURE: 97.8 F | RESPIRATION RATE: 17 BRPM | DIASTOLIC BLOOD PRESSURE: 78 MMHG | HEART RATE: 62 BPM | SYSTOLIC BLOOD PRESSURE: 118 MMHG

## 2021-07-15 ENCOUNTER — HOME CARE VISIT (OUTPATIENT)
Dept: HOME HEALTH SERVICES | Facility: HOME HEALTH | Age: 46
End: 2021-07-15
Payer: COMMERCIAL

## 2021-07-17 ENCOUNTER — HOME CARE VISIT (OUTPATIENT)
Dept: SCHEDULING | Facility: HOME HEALTH | Age: 46
End: 2021-07-17
Payer: COMMERCIAL

## 2021-07-17 VITALS
RESPIRATION RATE: 18 BRPM | DIASTOLIC BLOOD PRESSURE: 72 MMHG | SYSTOLIC BLOOD PRESSURE: 110 MMHG | HEART RATE: 78 BPM | OXYGEN SATURATION: 97 % | TEMPERATURE: 97.5 F

## 2021-07-17 PROCEDURE — G0151 HHCP-SERV OF PT,EA 15 MIN: HCPCS

## 2021-07-19 ENCOUNTER — HOME CARE VISIT (OUTPATIENT)
Dept: HOME HEALTH SERVICES | Facility: HOME HEALTH | Age: 46
End: 2021-07-19
Payer: COMMERCIAL

## 2021-07-22 ENCOUNTER — HOME CARE VISIT (OUTPATIENT)
Dept: SCHEDULING | Facility: HOME HEALTH | Age: 46
End: 2021-07-22
Payer: COMMERCIAL

## 2021-07-22 VITALS
TEMPERATURE: 98.6 F | RESPIRATION RATE: 18 BRPM | DIASTOLIC BLOOD PRESSURE: 62 MMHG | HEART RATE: 81 BPM | OXYGEN SATURATION: 97 % | SYSTOLIC BLOOD PRESSURE: 102 MMHG

## 2021-07-22 PROCEDURE — G0151 HHCP-SERV OF PT,EA 15 MIN: HCPCS

## 2021-07-22 NOTE — TELEPHONE ENCOUNTER
----- Message from Yamel Hanson sent at 7/22/2021  1:20 PM EDT -----  Regarding: Dr. Gerard Pascual Message/Vendor Calls    Caller's first and last name: Perla,       Reason for call: Speak to the nurse. Callback required yes/no and why: yes       Best contact number(s): [phone]      Details to clarify the request: Caller stated this is regarding the medication \"Baclofen\".       Yamel Hanson

## 2021-07-22 NOTE — TELEPHONE ENCOUNTER
Spoke with patient (spouse) advised him that the rx was sent to the pharmacy on 6/29/21 with the corrected directions.    Contacted pharmacy and spoke with the pharmacist and she stated they have it and the patient can pick it up tomorrow

## 2021-07-26 ENCOUNTER — HOME CARE VISIT (OUTPATIENT)
Dept: SCHEDULING | Facility: HOME HEALTH | Age: 46
End: 2021-07-26
Payer: COMMERCIAL

## 2021-07-26 VITALS — OXYGEN SATURATION: 98 % | HEART RATE: 82 BPM | TEMPERATURE: 98 F

## 2021-07-26 PROCEDURE — G0151 HHCP-SERV OF PT,EA 15 MIN: HCPCS

## 2021-07-28 ENCOUNTER — HOME CARE VISIT (OUTPATIENT)
Dept: SCHEDULING | Facility: HOME HEALTH | Age: 46
End: 2021-07-28
Payer: COMMERCIAL

## 2021-07-28 PROCEDURE — G0151 HHCP-SERV OF PT,EA 15 MIN: HCPCS

## 2021-07-29 VITALS
HEART RATE: 81 BPM | DIASTOLIC BLOOD PRESSURE: 60 MMHG | RESPIRATION RATE: 18 BRPM | TEMPERATURE: 97.5 F | OXYGEN SATURATION: 97 % | SYSTOLIC BLOOD PRESSURE: 110 MMHG

## 2021-08-02 ENCOUNTER — HOME CARE VISIT (OUTPATIENT)
Dept: SCHEDULING | Facility: HOME HEALTH | Age: 46
End: 2021-08-02
Payer: COMMERCIAL

## 2021-08-02 VITALS
OXYGEN SATURATION: 98 % | HEART RATE: 84 BPM | TEMPERATURE: 97.4 F | SYSTOLIC BLOOD PRESSURE: 124 MMHG | DIASTOLIC BLOOD PRESSURE: 58 MMHG | RESPIRATION RATE: 19 BRPM

## 2021-08-02 PROCEDURE — G0157 HHC PT ASSISTANT EA 15: HCPCS

## 2021-08-04 ENCOUNTER — HOME CARE VISIT (OUTPATIENT)
Dept: SCHEDULING | Facility: HOME HEALTH | Age: 46
End: 2021-08-04
Payer: COMMERCIAL

## 2021-08-04 VITALS
DIASTOLIC BLOOD PRESSURE: 64 MMHG | TEMPERATURE: 98.7 F | OXYGEN SATURATION: 100 % | HEART RATE: 73 BPM | SYSTOLIC BLOOD PRESSURE: 110 MMHG

## 2021-08-04 PROCEDURE — G0151 HHCP-SERV OF PT,EA 15 MIN: HCPCS

## 2021-10-20 DIAGNOSIS — G35 MULTIPLE SCLEROSIS (HCC): ICD-10-CM

## 2021-10-20 DIAGNOSIS — M62.838 MUSCLE SPASTICITY: ICD-10-CM

## 2021-10-20 RX ORDER — BACLOFEN 10 MG/1
10 TABLET ORAL 3 TIMES DAILY
Qty: 90 TABLET | Refills: 2 | Status: SHIPPED | OUTPATIENT
Start: 2021-10-20 | End: 2022-01-11

## 2021-10-20 NOTE — TELEPHONE ENCOUNTER
Requested Prescriptions     Pending Prescriptions Disp Refills    baclofen (LIORESAL) 10 mg tablet 90 Tablet 2     Sig: Take 1 Tablet by mouth three (3) times daily for 90 days.         Last Visit 6/29/21  Last Refill 6/29/21

## 2022-01-11 DIAGNOSIS — G35 MULTIPLE SCLEROSIS (HCC): ICD-10-CM

## 2022-01-11 DIAGNOSIS — M62.838 MUSCLE SPASTICITY: ICD-10-CM

## 2022-01-11 RX ORDER — BACLOFEN 10 MG/1
TABLET ORAL
Qty: 90 TABLET | Refills: 0 | Status: SHIPPED | OUTPATIENT
Start: 2022-01-11 | End: 2022-02-23

## 2022-01-11 NOTE — TELEPHONE ENCOUNTER
Called patient- no answer, unable to leave a voice message, mailbox is full. she has additional refills for this medication- Baclofen

## 2022-02-23 DIAGNOSIS — M62.838 MUSCLE SPASTICITY: ICD-10-CM

## 2022-02-23 DIAGNOSIS — G35 MULTIPLE SCLEROSIS (HCC): ICD-10-CM

## 2022-02-23 RX ORDER — BACLOFEN 10 MG/1
TABLET ORAL
Qty: 90 TABLET | Refills: 0 | Status: SHIPPED | OUTPATIENT
Start: 2022-02-23

## 2022-11-15 ENCOUNTER — OFFICE VISIT (OUTPATIENT)
Dept: PRIMARY CARE CLINIC | Age: 47
End: 2022-11-15
Payer: COMMERCIAL

## 2022-11-15 VITALS
BODY MASS INDEX: 29.76 KG/M2 | TEMPERATURE: 97.8 F | HEART RATE: 80 BPM | HEIGHT: 67 IN | SYSTOLIC BLOOD PRESSURE: 134 MMHG | DIASTOLIC BLOOD PRESSURE: 79 MMHG | OXYGEN SATURATION: 99 % | RESPIRATION RATE: 16 BRPM

## 2022-11-15 DIAGNOSIS — G35 MULTIPLE SCLEROSIS (HCC): ICD-10-CM

## 2022-11-15 DIAGNOSIS — R06.83 SNORES: ICD-10-CM

## 2022-11-15 DIAGNOSIS — Z23 NEEDS FLU SHOT: ICD-10-CM

## 2022-11-15 DIAGNOSIS — J33.9 NASAL POLYP: ICD-10-CM

## 2022-11-15 DIAGNOSIS — R04.0 EPISTAXIS: Primary | ICD-10-CM

## 2022-11-15 PROCEDURE — 90686 IIV4 VACC NO PRSV 0.5 ML IM: CPT | Performed by: INTERNAL MEDICINE

## 2022-11-15 PROCEDURE — 99213 OFFICE O/P EST LOW 20 MIN: CPT | Performed by: INTERNAL MEDICINE

## 2022-11-15 NOTE — PROGRESS NOTES
Chief Complaint   Patient presents with    Epistaxis     On and off       Visit Vitals  /79 (BP 1 Location: Right arm)   Pulse 80   Temp 97.8 °F (36.6 °C)   Resp 16   Ht 5' 7\" (1.702 m)   LMP 07/05/2022 (Approximate)   SpO2 99%   BMI 29.76 kg/m²       1. Have you been to the ER, urgent care clinic since your last visit? Hospitalized since your last visit? No    2. Have you seen or consulted any other health care providers outside of the [address] [place] [address] since your last visit? Include any pap smears or colon screening.  No

## 2022-11-15 NOTE — PROGRESS NOTES
Lindley Boxer (: 1975) is a 50 y.o. female, established patient, here for evaluation of the following chief complaint(s):  Epistaxis (On and off)       ASSESSMENT/PLAN:  Below is the assessment and plan developed based on review of pertinent history, physical exam, labs, studies, and medications. 1. Epistaxis   On exam there is a polyp at the back of left nostril. I would recommend them to see an ENT  -     REFERRAL TO ENT-OTOLARYNGOLOGY  2. Multiple sclerosis ([address]  Followed by neurologist.  On Tecfidera. Last MRI did not show any progression of the disease. 3. Needs flu shot  -     INFLUENZA, FLUARIX, FLULAVAL, FLUZONE (AGE 6 MO+), AFLURIA(AGE 3Y+) IM, PF, 0.5 ML  4. Nasal polyp  -     REFERRAL TO ENT-OTOLARYNGOLOGY  5. Snores  I told him after the ENT appointment she should consider sleep study.  -     REFERRAL TO ENT-OTOLARYNGOLOGY        SUBJECTIVE/OBJECTIVE:  Patient brought to the office by her  and her daughter. She has been having nosebleed for last few months. Her  describes as a small amount of blood comes out on the tissue paper couple of times a month. She is not using any nasal spray for allergies. No injuries or trauma lately. She has been on the same medications for multiple sclerosis. They have moved to [facility] now and have not established with a new PCP yet. She is still seeing the neurologist at [facility]. Still having urinary incontinence but no progression so they do not want to take any medication at this time.  has noticed that she snores a lot at night but she is not aware of it. She thinks daytime fatigue most likely due to her MS.   Visit Vitals  /79 (BP 1 Location: Right arm)   Pulse 80   Temp 97.8 °F (36.6 °C)   Resp 16   Ht 5' 7\" (1.702 m)   LMP 2022 (Approximate)   SpO2 99%   BMI 29.76 kg/m²      Patient Active Problem List   Diagnosis Code    Urinary incontinence R32    Fatigue R53.83    Constipation K59.00    Multiple sclerosis ([facility] G35        Current Outpatient Medications on File Prior to Visit   Medication Sig Dispense Refill    baclofen (LIORESAL) 10 mg tablet TAKE 1 TABLET BY MOUTH THREE TIMES DAILY 90 Tablet 0    CHOLECALCIFEROL, VITAMIN D3, PO Take 1 Tablet by mouth daily. dimethyl fumarate DR (TECFIDERA) 240 mg capsule Take 1 Tablet by mouth two (2) times a day. furosemide (LASIX) 40 mg tablet Twice a week (Patient not taking: Reported on 11/15/2022) 30 Tablet 0    multivitamin (ONE A DAY) tablet Take 1 Tab by mouth daily. (Patient not taking: No sig reported)       No current facility-administered medications on file prior to visit.        Allergies   Allergen Reactions    Penicillins Rash       Past Medical History:   Diagnosis Date    Constipation 6/24/2009    Fatigue 6/24/2009    MS (multiple sclerosis) ([facility]     Urinary incontinence 6/24/2009       Past Surgical History:   Procedure Laterality Date    HX GYN      tubal ligation       Family History   Problem Relation Age of Onset    Liver Disease Mother        Social History     Socioeconomic History    Marital status:      Spouse name: Not on file    Number of children: Not on file    Years of education: Not on file    Highest education level: Not on file   Occupational History    Not on file   Tobacco Use    Smoking status: Never    Smokeless tobacco: Never   Vaping Use    Vaping Use: Never used   Substance and Sexual Activity    Alcohol use: No    Drug use: No    Sexual activity: Yes     Partners: Male     Comment:    Other Topics Concern    Not on file   Social History Narrative    Not on file     Social Determinants of Health     Financial Resource Strain: Not on file   Food Insecurity: Not on file   Transportation Needs: Not on file   Physical Activity: Not on file   Stress: Not on file   Social Connections: Not on file   Intimate Partner Violence: Not on file   Housing Stability: Not on file       No visits with results within 3 Month(s) from this visit. Latest known visit with results is:   Office Visit on 06/29/2021   Component Date Value Ref Range Status    Hep C virus Ab Interp. 06/29/2021 NONREACTIVE  NONREACTIVE   Final    Hep C  virus Ab comment 06/29/2021 Method used is [facility]    Final    Cholesterol, total 06/29/2021 273 (A)  <200 MG/DL Final    Triglyceride 06/29/2021 110  <150 MG/DL Final    Comment: Based on NCEP-ATP III:  Triglycerides <150 mg/dL  is considered normal, 150-199  mg/dL  borderline high,  200-499 mg/dL high and  greater than or equal to 500  mg/dL very high. HDL Cholesterol 06/29/2021 71  MG/DL Final    Comment: Based on NCEP ATP III, HDL Cholesterol <40 mg/dL is considered low and >60  mg/dL is elevated.       LDL, calculated 06/29/2021 180 (A)  0 - 100 MG/DL Final    Comment: Based on the NCEP-ATP: LDL-C concentrations are considered  optimal <100 mg/dL,  near optimal/above Normal 100-129 mg/dL Borderline High: 130-159, High: 160-189  mg/dL Very High: Greater than or equal to 190 mg/dL      VLDL, calculated 06/29/2021 22  MG/DL Final    CHOL/HDL Ratio 06/29/2021 3.8  0.0 - 5.0   Final    WBC 06/29/2021 7.8  3.6 - 11.0 K/uL Final    RBC 06/29/2021 4.60  3.80 - 5.20 M/uL Final    HGB 06/29/2021 11.7  11.5 - 16.0 g/dL Final    HCT 06/29/2021 38.4  35.0 - 47.0 % Final    MCV 06/29/2021 83.5  80.0 - 99.0 FL Final    MCH 06/29/2021 25.4 (A)  26.0 - 34.0 PG Final    MCHC 06/29/2021 30.5  30.0 - 36.5 g/dL Final    RDW 06/29/2021 15.9 (A)  11.5 - 14.5 % Final    PLATELET 59/84/3478 113  150 - 400 K/uL Final    MPV 06/29/2021 11.3  8.9 - 12.9 FL Final    NRBC 06/29/2021 0.0  0  WBC Final    ABSOLUTE NRBC 06/29/2021 0.00  0.00 - 0.01 K/uL Final    Sodium 06/29/2021 139  136 - 145 mmol/L Final    Potassium 06/29/2021 4.6  3.5 - 5.1 mmol/L Final    Chloride 06/29/2021 105  97 - 108 mmol/L Final    CO2 06/29/2021 30  21 - 32 mmol/L Final    Anion gap 06/29/2021 4 (A)  5 - 15 mmol/L Final    Glucose 06/29/2021 71  65 - 100 mg/dL Final    BUN 06/29/2021 11  6 - 20 MG/DL Final    Creatinine 06/29/2021 0.35 (A)  0.55 - 1.02 MG/DL Final    BUN/Creatinine ratio 06/29/2021 31 (A)  12 - 20   Final    GFR est AA 06/29/2021 >60  >60 ml/min/1.73m2 Final    GFR est non-AA 06/29/2021 >60  >60 ml/min/1.73m2 Final    Comment: Estimated GFR is calculated using the IDMS-traceable Modification of Diet in  Renal Disease (MDRD) Study equation, reported for both  Americans  (GFRAA) and non- Americans (GFRNA), and normalized to 1.73m2 body  surface area. The physician must decide which value applies to the patient. Calcium 06/29/2021 9.5  8.5 - 10.1 MG/DL Final    Bilirubin, total 06/29/2021 0.3  0.2 - 1.0 MG/DL Final    ALT (SGPT) 06/29/2021 20  12 - 78 U/L Final    AST (SGOT) 06/29/2021 10 (A)  15 - 37 U/L Final    Alk. phosphatase 06/29/2021 120 (A)  45 - 117 U/L Final    Protein, total 06/29/2021 7.5  6.4 - 8.2 g/dL Final    Albumin 06/29/2021 3.5  3.5 - 5.0 g/dL Final    Globulin 06/29/2021 4.0  2.0 - 4.0 g/dL Final    A-G Ratio 06/29/2021 0.9 (A)  1.1 - 2.2   Final    Vitamin D 25-Hydroxy 06/29/2021 26.6 (A)  30 - 100 ng/mL Final    Comment: (NOTE)  Deficiency               <20 ng/mL  Insufficiency          20-30 ng/mL  Sufficient             ng/mL  Possible toxicity       >100 ng/mL    The Method used is Siemens Advia Centaur currently standardized to a   Center of Disease Control and Prevention (CDC) certified reference   [address]. Samples containing fluorescein dye can produce falsely   elevated values when tested with the ADVIA Centaur Vitamin D Assay. It is recommended that results in the toxic range, >100 ng/mL, be   retested 72 hours post fluorescein exposure. Review of Systems    Physical Exam    Vitals and nursing note reviewed. Constitutional:       Appearance: Normal appearance. .   Eyes:      Extraocular Movements: Extraocular movements intact.       Pupils: Pupils are equal, round, and reactive to light. ENT: left nostril polyp + , + yellowish secretions in both nostrils   Cardiovascular:      Rate and Rhythm: Normal rate and regular rhythm. Pulmonary:      Effort: Pulmonary effort is normal.      Breath sounds: Normal breath sounds. Abdominal:      General: Bowel sounds are normal. There is no distension. Palpations: Abdomen is soft. Musculoskeletal:         General: No swelling. Normal range of motion. Cervical back: Normal range of motion and neck supple. Right lower leg: No edema. Left lower leg: No edema. Neurological:      General: No focal deficit present. Mental Status:  Alert and oriented to person, place, and time. Motor: No weakness. Psychiatric:         Mood and Affect: Mood normal.         Behavior: Behavior normal.          An electronic signature was used to authenticate this note.   -- Debby Valdez MD

## 2023-02-06 ENCOUNTER — TELEPHONE (OUTPATIENT)
Dept: PRIMARY CARE CLINIC | Age: 48
End: 2023-02-06

## 2023-02-06 NOTE — TELEPHONE ENCOUNTER
----- Message from Shelia Lee sent at 2/6/2023 11:45 AM EST -----  Subject: Message to Provider    QUESTIONS  Information for Provider? Pt is in need of a new hospital bed for home. [facility] said that they faxed over approval to Dr. Karolyn hoff. They are awaiting a signature and response. Current bed is   broken. Please advise patient.   ---------------------------------------------------------------------------  --------------  Yaakov MIRAMONTES  [phone]; OK to leave message on voicemail  ---------------------------------------------------------------------------  --------------  SCRIPT ANSWERS  Relationship to Patient?  Self

## 2023-02-07 ENCOUNTER — TELEPHONE (OUTPATIENT)
Dept: PRIMARY CARE CLINIC | Age: 48
End: 2023-02-07

## 2023-02-07 NOTE — TELEPHONE ENCOUNTER
Called and had to leave a VM for the patient to call the office, needing some more information, have not received any papers requesting a hospital bed

## 2023-02-10 ENCOUNTER — TELEPHONE (OUTPATIENT)
Dept: PRIMARY CARE CLINIC | Age: 48
End: 2023-02-10

## 2023-02-10 NOTE — TELEPHONE ENCOUNTER
Called and left a VM for the patient's , notifying him that I have sent over the information for a Hospital Bed that they are requesting through [facility] via [address] [place] [address]. [facility]

## 2023-02-14 ENCOUNTER — TELEPHONE (OUTPATIENT)
Dept: PRIMARY CARE CLINIC | Age: 48
End: 2023-02-14

## 2023-02-14 NOTE — TELEPHONE ENCOUNTER
Called and left a VM for the patient's . Per Yoolink the responder for [facility]- they provided a bed for her 8/8/2019, and is not eligible for a new one until 8/9/2024 as the insurance only covers one every 5 years.   For her to get this bed, she will be private pay which they have stated will be $100.00 a month

## 2023-08-07 ENCOUNTER — TELEPHONE (OUTPATIENT)
Dept: PRIMARY CARE CLINIC | Facility: CLINIC | Age: 48
End: 2023-08-07

## 2023-08-07 NOTE — TELEPHONE ENCOUNTER
Daughter calling With Patient next to her. Has a History of UTI, Patient is having a lot of pain and Burning, Weakness and hard to bring her in. Asking for something for to be called in if Possible. Also asking for a call back today. Did advise I would damian as High priority .

## 2023-08-07 NOTE — TELEPHONE ENCOUNTER
Started 2 days ago roughly, having burning, and urinary frequency, feeling dizzy and having weakness    has given cranberry juice and honey water. Told him that an appointment is going to be needed. But I need to check with Dr Hanny Pro to see where I will be able to work her in. Have not seen since NOV 2022.    He said oh so we have to wait, I said yes until I get a response

## 2023-08-18 ENCOUNTER — TELEPHONE (OUTPATIENT)
Dept: PRIMARY CARE CLINIC | Facility: CLINIC | Age: 48
End: 2023-08-18

## 2023-08-18 NOTE — TELEPHONE ENCOUNTER
Patient's daughter Juan Davidson is returning a missed call from clinical staff. Juan Davidson is not on PHI. Juan Davidson said patient has a history of UTI and has been dealing with a continuous UTI for two weeks. Juan Davidson has been trying to get a script called in since 8/7.     Juan Laienio phone: [phone]

## 2023-08-18 NOTE — TELEPHONE ENCOUNTER
Called daughter- told her I couldn't discuss with her because she is not on PHI. That I will call the patient's  who is on PHI. Called him, patient never went to UC. Patient is still having UTI s/s burning and frequency. Told him to please go to an urgent care because we are done seeing patients for today and that if this is on going for two weeks she should not wait any longer. He said okay- he will.

## 2023-08-18 NOTE — TELEPHONE ENCOUNTER
Called and left a VM for the patient/ to call office back. Needing to provide information as to what is going on.

## 2024-04-11 NOTE — TELEPHONE ENCOUNTER
Paperwork is complete and faxed. This was paperwork for the smaller wheelchair so that she is able to navigate through living spaces. Patient calls to report dysuria, frequency,cloudy urine, and lower back pain. Patient has concerns for a UTI.     Urine culture order placed, patient informed results will not come back for 2-3 days. Patient verbalized understanding, will continue to push fluids and take OTC Azo over the weekend if needed.

## 2024-10-11 ENCOUNTER — TELEPHONE (OUTPATIENT)
Dept: PRIMARY CARE CLINIC | Facility: CLINIC | Age: 49
End: 2024-10-11

## 2024-10-11 NOTE — TELEPHONE ENCOUNTER
Insurance sent over prescription for patients bed.  They are waiting on Dr Morales to sign off on it and the daughter says she is in dire need of a bed.     I spoke with daughter Laura but the patient was there and gave me permission to speak with her.     Laura  [phone]

## 2024-10-11 NOTE — TELEPHONE ENCOUNTER
Called and spoke with the daughter she is on PHI however I did get verbal from the patient as well.   The patient is needing something signed for her bed, a piece is broken. Told her that the patient has not been seen since 2022, if she is still seeing Dr Morales as PCP then she needs to have an in person appointment, we need documentation.  Dr Morales was under the impression since they moved that they had found a new PCP closer, she said she isnt sure, she will need to double check with the patients spouse and the other sister. I told her to maybe see if the Neurologist could possibly sign for the time being? She said she will check and she thank you

## 2024-10-15 ENCOUNTER — TELEPHONE (OUTPATIENT)
Dept: PRIMARY CARE CLINIC | Facility: CLINIC | Age: 49
End: 2024-10-15

## 2024-10-15 ENCOUNTER — OFFICE VISIT (OUTPATIENT)
Dept: PRIMARY CARE CLINIC | Facility: CLINIC | Age: 49
End: 2024-10-15
Payer: COMMERCIAL

## 2024-10-15 ENCOUNTER — HOSPITAL ENCOUNTER (OUTPATIENT)
Facility: HOSPITAL | Age: 49
Discharge: HOME | End: 2024-10-18
Attending: INTERNAL MEDICINE
Payer: COMMERCIAL

## 2024-10-15 VITALS
DIASTOLIC BLOOD PRESSURE: 79 MMHG | RESPIRATION RATE: 18 BRPM | TEMPERATURE: 97.8 F | SYSTOLIC BLOOD PRESSURE: 122 MMHG | OXYGEN SATURATION: 100 % | HEART RATE: 90 BPM | HEIGHT: 67 IN

## 2024-10-15 DIAGNOSIS — M79.89 LEG SWELLING: ICD-10-CM

## 2024-10-15 DIAGNOSIS — Z23 FLU VACCINE NEED: ICD-10-CM

## 2024-10-15 DIAGNOSIS — E55.9 VITAMIN D DEFICIENCY: ICD-10-CM

## 2024-10-15 DIAGNOSIS — R63.5 WEIGHT GAIN, ABNORMAL: ICD-10-CM

## 2024-10-15 DIAGNOSIS — G35 MULTIPLE SCLEROSIS (HCC): Primary | ICD-10-CM

## 2024-10-15 DIAGNOSIS — R29.898 BILATERAL LEG WEAKNESS: ICD-10-CM

## 2024-10-15 PROCEDURE — 90471 IMMUNIZATION ADMIN: CPT | Performed by: INTERNAL MEDICINE

## 2024-10-15 PROCEDURE — 71046 X-RAY EXAM CHEST 2 VIEWS: CPT

## 2024-10-15 PROCEDURE — 90661 CCIIV3 VAC ABX FR 0.5 ML IM: CPT | Performed by: INTERNAL MEDICINE

## 2024-10-15 PROCEDURE — 99214 OFFICE O/P EST MOD 30 MIN: CPT | Performed by: INTERNAL MEDICINE

## 2024-10-15 SDOH — ECONOMIC STABILITY: FOOD INSECURITY
WITHIN THE PAST 12 MONTHS, YOU WORRIED THAT YOUR FOOD WOULD RUN OUT BEFORE YOU GOT MONEY TO BUY MORE.: PATIENT UNABLE TO ANSWER

## 2024-10-15 SDOH — ECONOMIC STABILITY: INCOME INSECURITY
HOW HARD IS IT FOR YOU TO PAY FOR THE VERY BASICS LIKE FOOD, HOUSING, MEDICAL CARE, AND HEATING?: PATIENT UNABLE TO ANSWER

## 2024-10-15 SDOH — ECONOMIC STABILITY: FOOD INSECURITY
WITHIN THE PAST 12 MONTHS, THE FOOD YOU BOUGHT JUST DIDN'T LAST AND YOU DIDN'T HAVE MONEY TO GET MORE.: PATIENT UNABLE TO ANSWER

## 2024-10-15 ASSESSMENT — PATIENT HEALTH QUESTIONNAIRE - PHQ9
SUM OF ALL RESPONSES TO PHQ QUESTIONS 1-9: 0
SUM OF ALL RESPONSES TO PHQ9 QUESTIONS 1 & 2: 0
2. FEELING DOWN, DEPRESSED OR HOPELESS: NOT AT ALL
SUM OF ALL RESPONSES TO PHQ QUESTIONS 1-9: 0
1. LITTLE INTEREST OR PLEASURE IN DOING THINGS: NOT AT ALL

## 2024-10-15 ASSESSMENT — ENCOUNTER SYMPTOMS
RHINORRHEA: 0
SHORTNESS OF BREATH: 0
COUGH: 0
CHEST TIGHTNESS: 0
COLOR CHANGE: 0
DIARRHEA: 0
EYE DISCHARGE: 0
CONSTIPATION: 0
ABDOMINAL PAIN: 0
SORE THROAT: 0
BACK PAIN: 0

## 2024-10-15 NOTE — PROGRESS NOTES
Health Decision Maker has been checked with the patient      AI form not signed  Patient is ok with scribe  Chief Complaint   Patient presents with    Follow-up      number [id number]  Went over waiver with them    Durable Medical Equipment     Needs medical bed script - insurance sent us a form for PCP to sign off       \"Have you been to the ER, urgent care clinic since your last visit?  Hospitalized since your last visit?\"    NO    “Have you seen or consulted any other health care providers outside of [facility] since your last visit?”    NO      There were no vitals filed for this visit.   Depression: Not at risk (10/15/2024)    PHQ-2     PHQ-2 Score: 0      Have you had a mammogram?”   NO    No breast cancer screening on file      “Have you had a pap smear?”    NO    No cervical cancer screening on file         “Have you had a colorectal cancer screening such as a colonoscopy/FIT/Cologuard?    NO    No colonoscopy on file  No cologuard on file  No FIT/FOBT on file   No flexible sigmoidoscopy on file         Click Here for Release of Records Request    Chart reviewed: immunizations are documented.   Immunization History   Administered Date(s) Administered    COVID-19, PFIZER PURPLE top, DILUTE for use, (age 12 y+), 30mcg/0.3mL 05/16/2021, 06/06/2021    Influenza Virus Vaccine 10/30/2019    Influenza, FLUARIX, FLULAVAL, FLUZONE (age 6 mo+) and AFLURIA, (age 3 y+), Quadv PF, 0.5mL 10/17/2017, 11/15/2022      
10.1 MG/DL Final    Total Bilirubin 06/29/2021 0.3  0.2 - 1.0 MG/DL Final    ALT 06/29/2021 20  12 - 78 U/L Final    AST 06/29/2021 10 (L)  15 - 37 U/L Final    Alkaline Phosphatase 06/29/2021 120 (H)  45 - 117 U/L Final    Total Protein 06/29/2021 7.5  6.4 - 8.2 g/dL Final    Albumin 06/29/2021 3.5  3.5 - 5.0 g/dL Final    Globulin 06/29/2021 4.0  2.0 - 4.0 g/dL Final    Albumin/Globulin Ratio 06/29/2021 0.9 (L)  1.1 - 2.2   Final    Hepatitis C Ab 06/29/2021 NONREACTIVE  NONREACTIVE   Final    Hepatitis C Comment 06/29/2021 Method used is Siemens Advia Cookstraur    Final         Review of Systems   Constitutional:  Positive for fatigue. Negative for activity change and unexpected weight change.   HENT:  Negative for congestion, hearing loss, rhinorrhea and sore throat.    Eyes:  Negative for discharge.   Respiratory:  Negative for cough, chest tightness and shortness of breath.    Gastrointestinal:  Negative for abdominal pain, constipation and diarrhea.   Genitourinary:  Negative for dysuria, flank pain, frequency and urgency.   Musculoskeletal:  Positive for joint swelling. Negative for arthralgias, back pain and myalgias.   Skin:  Negative for color change and rash.   Neurological:  Positive for weakness. Negative for dizziness, light-headedness and headaches.   Psychiatric/Behavioral:  Negative for dysphoric mood and sleep disturbance. The patient is not nervous/anxious.           /79 (Site: Left Upper Arm, Position: Sitting, Cuff Size: Large Adult)   Pulse 90   Temp 97.8 °F (36.6 °C) (Oral)   Resp 18   Ht 1.702 m (5' 7\") Comment: patient in wheelchair  - daughter reported  SpO2 100%     Physical Exam  Constitutional:       Appearance: She is obese.   HENT:      Mouth/Throat:      Mouth: Mucous membranes are moist.      Pharynx: Oropharynx is clear.   Eyes:      General:         Right eye: No discharge.         Left eye: No discharge.   Cardiovascular:      Rate and Rhythm: Normal rate and regular

## 2024-10-15 NOTE — TELEPHONE ENCOUNTER
Called and spoke to pts daughter and asked if insurance already sent the script for pts bed and she said yes. I told her I am sorry, I'm not sure where the script is and the nurse that spoke to yall last week is not here. Daughter said her dad will call insurance and ask for them to re-fax.

## 2024-10-16 LAB
25(OH)D3 SERPL-MCNC: 25.4 NG/ML (ref 30–100)
ALBUMIN SERPL-MCNC: 3.4 G/DL (ref 3.5–5)
ALBUMIN/GLOB SERPL: 0.9 (ref 1.1–2.2)
ALP SERPL-CCNC: 97 U/L (ref 45–117)
ALT SERPL-CCNC: 26 U/L (ref 12–78)
ANION GAP SERPL CALC-SCNC: 4 MMOL/L (ref 2–12)
AST SERPL-CCNC: 9 U/L (ref 15–37)
BILIRUB SERPL-MCNC: 0.4 MG/DL (ref 0.2–1)
BUN SERPL-MCNC: 12 MG/DL (ref 6–20)
BUN/CREAT SERPL: 24 (ref 12–20)
CALCIUM SERPL-MCNC: 9.3 MG/DL (ref 8.5–10.1)
CHLORIDE SERPL-SCNC: 106 MMOL/L (ref 97–108)
CO2 SERPL-SCNC: 30 MMOL/L (ref 21–32)
CREAT SERPL-MCNC: 0.49 MG/DL (ref 0.55–1.02)
ERYTHROCYTE [DISTWIDTH] IN BLOOD BY AUTOMATED COUNT: 13.5 % (ref 11.5–14.5)
GLOBULIN SER CALC-MCNC: 3.8 G/DL (ref 2–4)
GLUCOSE SERPL-MCNC: 102 MG/DL (ref 65–100)
HCT VFR BLD AUTO: 41.2 % (ref 35–47)
HGB BLD-MCNC: 13.1 G/DL (ref 11.5–16)
MCH RBC QN AUTO: 28.8 PG (ref 26–34)
MCHC RBC AUTO-ENTMCNC: 31.8 G/DL (ref 30–36.5)
MCV RBC AUTO: 90.5 FL (ref 80–99)
NRBC # BLD: 0 K/UL (ref 0–0.01)
NRBC BLD-RTO: 0 PER 100 WBC
PLATELET # BLD AUTO: 289 K/UL (ref 150–400)
PMV BLD AUTO: 11.8 FL (ref 8.9–12.9)
POTASSIUM SERPL-SCNC: 3.8 MMOL/L (ref 3.5–5.1)
PROT SERPL-MCNC: 7.2 G/DL (ref 6.4–8.2)
RBC # BLD AUTO: 4.55 M/UL (ref 3.8–5.2)
SODIUM SERPL-SCNC: 140 MMOL/L (ref 136–145)
TSH SERPL DL<=0.05 MIU/L-ACNC: 3.12 UIU/ML (ref 0.36–3.74)
WBC # BLD AUTO: 7.1 K/UL (ref 3.6–11)

## 2024-10-16 NOTE — TELEPHONE ENCOUNTER
Pt  is calling again asking if we received the fax the ins was suppose to send over. He said that the ins sent it yesterday. I doubled check the fax pile and didn't see nothing so I informed him I'll message the nurse and see if they have it. If you don't have it, he asked can you call him so he can call the ins again

## 2024-10-16 NOTE — TELEPHONE ENCOUNTER
Called and spoke to pt  and told him we have received the paperwork and I am waiting on Dr. Morales to sign it and I will fax today. He said thank you.

## 2024-11-26 ENCOUNTER — TELEPHONE (OUTPATIENT)
Dept: PRIMARY CARE CLINIC | Facility: CLINIC | Age: 49
End: 2024-11-26

## 2024-11-26 NOTE — TELEPHONE ENCOUNTER
Received a form for the patient, asking that we write a script for the patient to have a car lift attachment so the member can complete ADLs    Asking for script that stats why is needed and why it is needed.

## 2025-04-10 ENCOUNTER — TELEPHONE (OUTPATIENT)
Dept: PRIMARY CARE CLINIC | Facility: CLINIC | Age: 50
End: 2025-04-10

## 2025-04-28 ENCOUNTER — OFFICE VISIT (OUTPATIENT)
Dept: PRIMARY CARE CLINIC | Facility: CLINIC | Age: 50
End: 2025-04-28
Payer: COMMERCIAL

## 2025-04-28 VITALS
SYSTOLIC BLOOD PRESSURE: 110 MMHG | OXYGEN SATURATION: 100 % | DIASTOLIC BLOOD PRESSURE: 75 MMHG | HEIGHT: 67 IN | HEART RATE: 79 BPM | RESPIRATION RATE: 16 BRPM | TEMPERATURE: 97.6 F

## 2025-04-28 DIAGNOSIS — R29.898 BILATERAL LEG WEAKNESS: ICD-10-CM

## 2025-04-28 DIAGNOSIS — G35 MULTIPLE SCLEROSIS (HCC): Primary | ICD-10-CM

## 2025-04-28 DIAGNOSIS — R73.02 IGT (IMPAIRED GLUCOSE TOLERANCE): ICD-10-CM

## 2025-04-28 DIAGNOSIS — E55.9 VITAMIN D DEFICIENCY: ICD-10-CM

## 2025-04-28 PROCEDURE — 99213 OFFICE O/P EST LOW 20 MIN: CPT | Performed by: INTERNAL MEDICINE

## 2025-04-28 RX ORDER — OMEGA-3S/DHA/EPA/FISH OIL/D3 300MG-1000
400 CAPSULE ORAL DAILY
COMMUNITY

## 2025-04-28 SDOH — ECONOMIC STABILITY: FOOD INSECURITY: WITHIN THE PAST 12 MONTHS, THE FOOD YOU BOUGHT JUST DIDN'T LAST AND YOU DIDN'T HAVE MONEY TO GET MORE.: NEVER TRUE

## 2025-04-28 SDOH — ECONOMIC STABILITY: FOOD INSECURITY: WITHIN THE PAST 12 MONTHS, YOU WORRIED THAT YOUR FOOD WOULD RUN OUT BEFORE YOU GOT MONEY TO BUY MORE.: NEVER TRUE

## 2025-04-28 ASSESSMENT — ENCOUNTER SYMPTOMS
SORE THROAT: 0
DIARRHEA: 0
COLOR CHANGE: 0
EYE DISCHARGE: 0
SHORTNESS OF BREATH: 0
CHEST TIGHTNESS: 0
COUGH: 0
CONSTIPATION: 0
ABDOMINAL PAIN: 0
BACK PAIN: 0
RHINORRHEA: 0

## 2025-04-28 ASSESSMENT — PATIENT HEALTH QUESTIONNAIRE - PHQ9
2. FEELING DOWN, DEPRESSED OR HOPELESS: NOT AT ALL
SUM OF ALL RESPONSES TO PHQ QUESTIONS 1-9: 0
1. LITTLE INTEREST OR PLEASURE IN DOING THINGS: NOT AT ALL

## 2025-04-28 NOTE — PROGRESS NOTES
Health Decision Maker has been checked with the patient   Primary Decision Maker: Nereida Telles - Spouse - [phone]     Patient has stated that the scribe can come in room    Chief Complaint   Patient presents with    Follow-up     Needing to get an electric wheel chair       \"Have you been to the ER, urgent care clinic since your last visit?  Hospitalized since your last visit?\"    NO    “Have you seen or consulted any other health care providers outside of [facility] since your last visit?”    NO      Vitals:    04/28/25 1439   BP: 110/75   BP Site: Right Upper Arm   Patient Position: Sitting   BP Cuff Size: Large Adult   Pulse: 79   Resp: 16   Temp: 97.6 °F (36.4 °C)   TempSrc: Temporal   SpO2: 100%   Height: 1.702 m (5' 7\")      Depression: Not at risk (4/28/2025)    PHQ-2     PHQ-2 Score: 0      Have you had a mammogram?”   NO    No breast cancer screening on file      “Have you had a pap smear?”    NO    No cervical cancer screening on file         “Have you had a colorectal cancer screening such as a colonoscopy/FIT/Cologuard?    NO    No colonoscopy on file  No cologuard on file  No FIT/FOBT on file   No flexible sigmoidoscopy on file         Click Here for Release of Records Request    Chart reviewed: immunizations are documented.   Immunization History   Administered Date(s) Administered    COVID-19, PFIZER PURPLE top, DILUTE for use, (age 12 y+), 30mcg/0.3mL 05/16/2021, 06/06/2021    Influenza Virus Vaccine 10/30/2019    Influenza, FLUARIX, FLULAVAL, FLUZONE (age 6 mo+) and AFLURIA, (age 3 y+), Quadv PF, 0.5mL 10/17/2017, 11/15/2022    Influenza, FLUCELVAX, (age 6 mo+) IM, Trivalent PF, 0.5mL 10/15/2024

## 2025-04-28 NOTE — PROGRESS NOTES
Nico Galvan (:  1975) is a 49 y.o. female, Established patient, here for evaluation of the following chief complaint(s):  Follow-up (Needing to get an electric wheel chair)      ASSESSMENT/PLAN:  1. Multiple sclerosis (HCC)  She has a history of MS and is unable to move her lower extremities in any meaningful way. However, she is able to use her hand and arm to operate a motorized wheelchair and is mentally capable of of doing so. She is at risk of fall at home without a wheelchair.     I recommend that she continue taking Baclofen 10 mg and Tecfidera 240 mg BID. She is followed by neurology.    2. Bilateral leg weakness  She is followed by neurology. I recommend that she continue taking Baclofen 10 mg.    3. IGT (impaired glucose tolerance)  -     Hemoglobin A1C; Future  I ordered blood work to measure Hgb A1C levels.   I recommend that her family monitor her diet.   4. Vitamin D deficiency  -     Vitamin D 25 Hydroxy; Future  Ordered lab work to check Vitamin D levels. Waiting for results. Recommend that patient take a Vitamin D supplement of 1,000 units daily.     .         Subjective   SUBJECTIVE/OBJECTIVE:  HPI    Patient presents today for a follow-up on chronic conditions and for acquiring a power wheelchair. She is accompanied by her  and children who provide supplemental information.    She has a history of MS and is unable to move her lower extremities in any meaningful way. However, she is able to use her hands and arms to operate a motorized wheelchair and is mentally capable of of doing so. She is at risk of fall at home without a wheelchair.     She is followed semiannually by Dr. Crowell (neurology) for MS. She takes baclofen 10 mg and Tecfidera 240 mg BID. She describes her sleep as good.    She is unable to walk at home besides from her bed or chair to the bathroom and back. She has not experienced any falls at home. She is using her diapers. She denies any urinary problems.     She

## 2025-04-29 ENCOUNTER — RESULTS FOLLOW-UP (OUTPATIENT)
Dept: PRIMARY CARE CLINIC | Facility: CLINIC | Age: 50
End: 2025-04-29

## 2025-04-29 LAB
25(OH)D3+25(OH)D2 SERPL-MCNC: 39.7 NG/ML (ref 30–100)
EST. AVERAGE GLUCOSE BLD GHB EST-MCNC: 108 MG/DL
HBA1C MFR BLD: 5.4 % (ref 4.8–5.6)

## 2025-06-16 ENCOUNTER — TELEPHONE (OUTPATIENT)
Dept: PRIMARY CARE CLINIC | Facility: CLINIC | Age: 50
End: 2025-06-16

## 2025-06-16 NOTE — TELEPHONE ENCOUNTER
Called and spoke with the patients  I had faxed orders for a powered wheelchair on 4/30. Called [employer], they report that they have the information and have called the patient to schedule a meeting for eval and have not heard back.  Called the patient spouse, he was driving but told himhe will need to call [employer]. Will send information through the [employer] which he said was okay!

## 2025-06-16 NOTE — TELEPHONE ENCOUNTER
Patient's  called to let Dr Morales know that his wife needed a new power chair but didn't know how to go about getting paperwork to request one.  He would like a call back at #[phone]

## 2025-07-19 ENCOUNTER — HOSPITAL ENCOUNTER (EMERGENCY)
Facility: HOSPITAL | Age: 50
Discharge: HOME | End: 2025-07-19
Attending: EMERGENCY MEDICINE
Payer: COMMERCIAL

## 2025-07-19 VITALS
RESPIRATION RATE: 15 BRPM | BODY MASS INDEX: 32.96 KG/M2 | WEIGHT: 210 LBS | HEART RATE: 88 BPM | OXYGEN SATURATION: 98 % | DIASTOLIC BLOOD PRESSURE: 94 MMHG | TEMPERATURE: 97.7 F | HEIGHT: 67 IN | SYSTOLIC BLOOD PRESSURE: 124 MMHG

## 2025-07-19 DIAGNOSIS — R31.9 URINARY TRACT INFECTION WITH HEMATURIA, SITE UNSPECIFIED: Primary | ICD-10-CM

## 2025-07-19 DIAGNOSIS — N39.0 URINARY TRACT INFECTION WITH HEMATURIA, SITE UNSPECIFIED: Primary | ICD-10-CM

## 2025-07-19 LAB
ANION GAP SERPL CALC-SCNC: 11 MMOL/L (ref 2–12)
APPEARANCE UR: ABNORMAL
BACTERIA URNS QL MICRO: ABNORMAL /HPF
BASOPHILS # BLD: 0.04 K/UL (ref 0–0.1)
BASOPHILS NFR BLD: 0.6 % (ref 0–1)
BILIRUB UR QL: NEGATIVE
BUN SERPL-MCNC: 15 MG/DL (ref 6–20)
BUN/CREAT SERPL: ABNORMAL (ref 12–20)
CALCIUM SERPL-MCNC: 9.6 MG/DL (ref 8.6–10)
CHLORIDE SERPL-SCNC: 101 MMOL/L (ref 98–107)
CO2 SERPL-SCNC: 28 MMOL/L (ref 22–29)
COLOR UR: ABNORMAL
COMMENT:: NORMAL
COMMENT:: NORMAL
CREAT SERPL-MCNC: <0.47 MG/DL (ref 0.5–0.9)
DIFFERENTIAL METHOD BLD: NORMAL
EOSINOPHIL # BLD: 0.08 K/UL (ref 0–0.4)
EOSINOPHIL NFR BLD: 1.1 % (ref 0–7)
EPITH CASTS URNS QL MICRO: ABNORMAL /LPF
ERYTHROCYTE [DISTWIDTH] IN BLOOD BY AUTOMATED COUNT: 13.6 % (ref 11.5–14.5)
GLUCOSE SERPL-MCNC: 90 MG/DL (ref 65–100)
GLUCOSE UR STRIP.AUTO-MCNC: NEGATIVE MG/DL
HCT VFR BLD AUTO: 41.7 % (ref 35–47)
HGB BLD-MCNC: 14 G/DL (ref 11.5–16)
HGB UR QL STRIP: ABNORMAL
IMM GRANULOCYTES # BLD AUTO: 0.03 K/UL (ref 0–0.04)
IMM GRANULOCYTES NFR BLD AUTO: 0.4 % (ref 0–0.5)
KETONES UR QL STRIP.AUTO: NEGATIVE MG/DL
LEUKOCYTE ESTERASE UR QL STRIP.AUTO: ABNORMAL
LYMPHOCYTES # BLD: 1.91 K/UL (ref 0.8–3.5)
LYMPHOCYTES NFR BLD: 27.3 % (ref 12–49)
MCH RBC QN AUTO: 29.6 PG (ref 26–34)
MCHC RBC AUTO-ENTMCNC: 33.6 G/DL (ref 30–36.5)
MCV RBC AUTO: 88.2 FL (ref 80–99)
MONOCYTES # BLD: 0.72 K/UL (ref 0–1)
MONOCYTES NFR BLD: 10.3 % (ref 5–13)
NEUTS SEG # BLD: 4.22 K/UL (ref 1.8–8)
NEUTS SEG NFR BLD: 60.3 % (ref 32–75)
NITRITE UR QL STRIP.AUTO: POSITIVE
NRBC # BLD: 0 K/UL (ref 0–0.01)
NRBC BLD-RTO: 0 PER 100 WBC
PH UR STRIP: 6 (ref 5–8)
PLATELET # BLD AUTO: 312 K/UL (ref 150–400)
PMV BLD AUTO: 11 FL (ref 8.9–12.9)
POTASSIUM SERPL-SCNC: 4 MMOL/L (ref 3.5–5.1)
PROT UR STRIP-MCNC: NEGATIVE MG/DL
RBC # BLD AUTO: 4.73 M/UL (ref 3.8–5.2)
RBC #/AREA URNS HPF: ABNORMAL /HPF
SODIUM SERPL-SCNC: 140 MMOL/L (ref 136–145)
SP GR UR REFRACTOMETRY: 1.01 (ref 1–1.03)
SPECIMEN HOLD: NORMAL
UROBILINOGEN UR QL STRIP.AUTO: 0.2 EU/DL (ref 0.2–1)
WBC # BLD AUTO: 7 K/UL (ref 3.6–11)
WBC URNS QL MICRO: >100 /HPF (ref 0–4)

## 2025-07-19 PROCEDURE — 80048 BASIC METABOLIC PNL TOTAL CA: CPT

## 2025-07-19 PROCEDURE — 81001 URINALYSIS AUTO W/SCOPE: CPT

## 2025-07-19 PROCEDURE — 87088 URINE BACTERIA CULTURE: CPT

## 2025-07-19 PROCEDURE — 2580000003 HC RX 258

## 2025-07-19 PROCEDURE — 87186 SC STD MICRODIL/AGAR DIL: CPT

## 2025-07-19 PROCEDURE — 6360000002 HC RX W HCPCS

## 2025-07-19 PROCEDURE — 87086 URINE CULTURE/COLONY COUNT: CPT

## 2025-07-19 PROCEDURE — 99284 EMERGENCY DEPT VISIT MOD MDM: CPT

## 2025-07-19 PROCEDURE — 36415 COLL VENOUS BLD VENIPUNCTURE: CPT

## 2025-07-19 PROCEDURE — 2500000003 HC RX 250 WO HCPCS

## 2025-07-19 PROCEDURE — 96374 THER/PROPH/DIAG INJ IV PUSH: CPT

## 2025-07-19 PROCEDURE — 85025 COMPLETE CBC W/AUTO DIFF WBC: CPT

## 2025-07-19 RX ORDER — CEPHALEXIN 500 MG/1
500 CAPSULE ORAL 2 TIMES DAILY
Qty: 14 CAPSULE | Refills: 0 | Status: SHIPPED | OUTPATIENT
Start: 2025-07-19 | End: 2025-07-26

## 2025-07-19 RX ORDER — 0.9 % SODIUM CHLORIDE 0.9 %
1000 INTRAVENOUS SOLUTION INTRAVENOUS ONCE
Status: COMPLETED | OUTPATIENT
Start: 2025-07-19 | End: 2025-07-19

## 2025-07-19 RX ADMIN — CEFTRIAXONE 1000 MG: 1 INJECTION, POWDER, FOR SOLUTION INTRAMUSCULAR; INTRAVENOUS at 15:46

## 2025-07-19 RX ADMIN — SODIUM CHLORIDE 1000 ML: 0.9 INJECTION, SOLUTION INTRAVENOUS at 14:12

## 2025-07-19 ASSESSMENT — PAIN - FUNCTIONAL ASSESSMENT: PAIN_FUNCTIONAL_ASSESSMENT: NONE - DENIES PAIN

## 2025-07-19 NOTE — ED TRIAGE NOTES
Pt and family deny use of . Pt to triage via wheelchair.     Pt reports that she has had a UTI for the past couple of weeks; pt states she has been using OTC medication for it with slight relief then got worse. Pt endorses urinary frequency, dysuria. Pt and family state that pt has been having dizziness x2 days; which has happened with her past UTIs.    Pt has hx of MS and frequent UTIs.

## 2025-07-19 NOTE — ED PROVIDER NOTES
[facility]  EMERGENCY DEPARTMENT ENCOUNTER      Pt Name: Nico Galvan  MRN: [id number]  Birthdate 1975  Date of evaluation: 7/19/2025  Provider: Aníbal Johnson PA-C    CHIEF COMPLAINT       Chief Complaint   Patient presents with    Urinary Frequency    Dysuria         HISTORY OF PRESENT ILLNESS   (Location/Symptom, Timing/Onset, Context/Setting, Quality, Duration, Modifying Factors, Severity)  Note limiting factors.   50-year-old female with past medical history of MS presents with complaint of urinary tract infection.  Family reports frequent UTI history.  She has only needed to be hospitalized once for this in the past.  She usually does fine at home on oral medications.  Over the past few weeks, patient has developed burning with urination.  Denies fever, abdominal pain, nausea, vomiting, blood in the urine.  Denies back pain.  Patient wears adult diapers chronically, this is not new for her.    The history is provided by the patient and a relative. The history is limited by a language barrier. No  was used (Family declined .).         Review of External Medical Records:     Nursing Notes were reviewed.    REVIEW OF SYSTEMS    (2-9 systems for level 4, 10 or more for level 5)     Review of Systems    Except as noted above the remainder of the review of systems was reviewed and negative.       PAST MEDICAL HISTORY     Past Medical History:   Diagnosis Date    Constipation 6/24/2009    Fatigue 6/24/2009    MS (multiple sclerosis) (HCC)     Urinary incontinence 6/24/2009         SURGICAL HISTORY       Past Surgical History:   Procedure Laterality Date    GYN      tubal ligation         CURRENT MEDICATIONS       Previous Medications    BACLOFEN (LIORESAL) 10 MG TABLET        CHOLECALCIFEROL (VITAMIN D3) 400 UNIT TABS TABLET    Take 1 tablet by mouth daily    DIMETHYL FUMARATE (TECFIDERA) 240 MG DELAYED RELEASE CAPSULE    Take 1 tablet by mouth 2 times  SMALL (*)     Nitrite, Urine Positive (*)     Leukocyte Esterase, Urine LARGE (*)     WBC, UA >100 (*)     Epithelial Cells, UA MODERATE (*)     BACTERIA, URINE 3+ (*)     All other components within normal limits   BASIC METABOLIC PANEL - Abnormal; Notable for the following components:    Creatinine <0.47 (*)     All other components within normal limits   URINE CULTURE HOLD SAMPLE   CULTURE, URINE   CBC WITH AUTO DIFFERENTIAL   EXTRA TUBES HOLD   EXTRA TUBES HOLD       All other labs were within normal range or not returned as of this dictation.    EMERGENCY DEPARTMENT COURSE and DIFFERENTIAL DIAGNOSIS/MDM:   Vitals:    Vitals:    07/19/25 1308 07/19/25 1314   BP:  (!) 124/94   Pulse:  88   Resp:  15   Temp:  97.7 °F (36.5 °C)   TempSrc:  Oral   SpO2:  98%   Weight: 95.3 kg (210 lb)    Height: 1.702 m (5' 7\")            Medical Decision Making  50-year-old female presents with concern for UTI.  Patient is well-appearing, nontoxic, and with normal vital signs.  On exam, abdomen is soft, nontender, nondistended without rebound or guarding.  No evidence of peritonitis or acute abdomen at this time.  No CVA tenderness.  Clinical suspicion for cystitis.  Will check basic blood work and send urinalysis.    CBC without leukocytosis.  BMP normal without evidence for JS.  Straight cath performed while in the ED.  Urinalysis with obvious infection.  Will send urine culture and treat UTI.  Single dose Rocephin given while in the ED.  Patient is stable for discharge home at this time.  Will prescribe Keflex to start taking tomorrow.  Return precautions discussed with family who expresses understanding and is in agreement with the current plan.  Recommend follow-up with PCP.    Amount and/or Complexity of Data Reviewed  Labs: ordered. Decision-making details documented in ED Course.    Risk  Prescription drug management.          ED Course as of 07/19/25 1528   Sat Jul 19, 2025   1523 Nitrite, Urine(!): Positive [AF]   1523

## 2025-07-20 LAB
BACTERIA SPEC CULT: ABNORMAL
CC UR VC: ABNORMAL
SERVICE CMNT-IMP: ABNORMAL

## 2025-07-21 ENCOUNTER — TELEPHONE (OUTPATIENT)
Dept: PRIMARY CARE CLINIC | Facility: CLINIC | Age: 50
End: 2025-07-21

## 2025-07-21 LAB
BACTERIA SPEC CULT: ABNORMAL
CC UR VC: ABNORMAL
SERVICE CMNT-IMP: ABNORMAL

## 2025-07-21 NOTE — TELEPHONE ENCOUNTER
Patient  is calling to speak with the nurse and to also let her know that [employer] will be sending over paperwork for the power wheel chair.

## 2025-07-21 NOTE — TELEPHONE ENCOUNTER
Spoke to  and he was asking about paperwork for [employer]. I told him it was signed and faxed back on 7/17. He said ok, thank you.